# Patient Record
Sex: MALE | Race: ASIAN | NOT HISPANIC OR LATINO | Employment: FULL TIME
[De-identification: names, ages, dates, MRNs, and addresses within clinical notes are randomized per-mention and may not be internally consistent; named-entity substitution may affect disease eponyms.]

---

## 2017-10-28 ENCOUNTER — RECORDS - HEALTHEAST (OUTPATIENT)
Dept: ADMINISTRATIVE | Facility: OTHER | Age: 42
End: 2017-10-28

## 2020-09-04 ENCOUNTER — RECORDS - HEALTHEAST (OUTPATIENT)
Dept: ADMINISTRATIVE | Facility: OTHER | Age: 45
End: 2020-09-04

## 2020-09-09 ENCOUNTER — COMMUNICATION - HEALTHEAST (OUTPATIENT)
Dept: SCHEDULING | Facility: CLINIC | Age: 45
End: 2020-09-09

## 2020-09-23 ENCOUNTER — AMBULATORY - HEALTHEAST (OUTPATIENT)
Dept: OTHER | Facility: CLINIC | Age: 45
End: 2020-09-23

## 2021-05-29 ENCOUNTER — RECORDS - HEALTHEAST (OUTPATIENT)
Dept: ADMINISTRATIVE | Facility: CLINIC | Age: 46
End: 2021-05-29

## 2021-05-31 VITALS — HEIGHT: 60 IN | BODY MASS INDEX: 24.02 KG/M2

## 2021-05-31 VITALS — WEIGHT: 123 LBS

## 2021-06-11 NOTE — TELEPHONE ENCOUNTER
Daughter Julia is calling about covid.  Father is positive for covid for 5 days now.  Daughter states that he is getting worse.  Father is coughing all night and coughing is hurting stomach.  At night it is harder to breath.  Coughing is so severe that Robbin states that his stomach hurts when coughing.  Daughter is not sure of his 02 saturation.  Daughter feels that father is getting worse.    COVID 19 Nurse Triage Plan/Patient Instructions    Please be aware that novel coronavirus (COVID-19) may be circulating in the community. If you develop symptoms such as fever, cough, or SOB or if you have concerns about the presence of another infection including coronavirus (COVID-19), please contact your health care provider or visit [email or web address].     Disposition/Instructions    ED Visit recommended. Follow protocol based instructions.      Bring Your Own Device:  Please also bring your smart device(s) (smart phones, tablets, laptops) and their charging cables for your personal use and to communicate with your care team during your visit.      Thank you for taking steps to prevent the spread of this virus.  o Limit your contact with others.  o Wear a simple mask to cover your cough.  o Wash your hands well and often.    Resources    [facility]: About COVID-19: [email or web address]    CDC: What to Do If You're Sick: www.cdc.gov/coronavirus/2019-ncov/about/steps-when-sick.html    CDC: Ending Home Isolation: www.cdc.gov/coronavirus/2019-ncov/hcp/disposition-in-home-patients.html     CDC: Caring for Someone: www.cdc.gov/coronavirus/2019-ncov/if-you-are-sick/care-for-someone.html     [facility]: Interim Guidance for Hospital Discharge to Home: www.health.[facility].[place]/diseases/coronavirus/hcp/hospdischarge.pdf    [facility] clinical trials (COVID-19 research studies): clinicalaffairs.[facility]/umn-clinical-trials     Below are the COVID-19 hotlines at the [place] Department of Health ([facility]).  "Interpreters are available.   o For health questions: Call 423-408-1185 or 1-814.216.6415 (7 a.m. to 7 p.m.)  o For questions about schools and childcare: Call 923-472-0460 or 1-178.335.2604 (7 a.m. to 7 p.m.)       Additional Information    Negative: [1] Breathing stopped AND [2] hasn't returned    Negative: Choking on something    Negative: Severe difficulty breathing (e.g., struggling for each breath, speaks in single words)    Negative: Bluish (or gray) lips or face now    Negative: Difficult to awaken or acting confused (e.g., disoriented, slurred speech)    Negative: Passed out (i.e., lost consciousness, collapsed and was not responding)    Negative: Wheezing started suddenly after medicine, an allergic food or bee sting    Negative: Stridor    Negative: Slow, shallow and weak breathing    Negative: Sounds like a life-threatening emergency to the triager    [1] MILD difficulty breathing (e.g., minimal/no SOB at rest, SOB with walking, pulse <100) AND [2] NEW-onset or WORSE than normal    Negative: Fever > 103 F (39.4 C)    Negative: [1] Fever > 101 F (38.3 C) AND [2] age > 60    Negative: [1] Fever > 100.0 F (37.8 C) AND [2] bedridden (e.g., nursing home patient, CVA, chronic illness, recovering from surgery)    Negative: [1] Fever > 100.0 F (37.8 C) AND [2] diabetes mellitus or weak immune system (e.g., HIV positive, cancer chemo, splenectomy, organ transplant, chronic steroids)    Negative: [1] Periods where breathing stops and then resumes normally AND [2] bedridden (e.g., nursing home patient, CVA)    Negative: Pregnant or postpartum (< 1 month since delivery)    Negative: Patient sounds very sick or weak to the triager    Negative: Wheezing can be heard across the room    Negative: Drooling or spitting out saliva (because can't swallow)    Negative: History of prior \"blood clot\" in leg or lungs (i.e., deep vein thrombosis, pulmonary embolism)    Negative: History of inherited increased risk of blood clots " "(e.g., Factor 5 Leiden, Anti-thrombin 3, Protein C or Protein S deficiency, Prothrombin mutation)    Negative: Recent illness requiring prolonged bedrest (i.e., immobilization)    Negative: Hip or leg fracture in past 2 months (e.g., had cast on leg or ankle)    Negative: Major surgery in the past month    Negative: Recent long-distance travel with prolonged time in car, bus, plane, or train (i.e., within past 2 weeks; 6 or  more hours duration)    Negative: Extra heart beats OR irregular heart beating   (i.e., \"palpitations\")    Negative: [1] MODERATE difficulty breathing (e.g., speaks in phrases, SOB even at rest, pulse 100-120) AND [2] NEW-onset or WORSE than normal    Protocols used: BREATHING DIFFICULTY-A-AH      "

## 2021-06-11 NOTE — PROGRESS NOTES
Received intake for home oxygen at 10:23AM from my coworker Apolinar. Reviewed patient's chart; need signed MD order for O2.  10:42AM- Called care coordinator Cathy and let her know that F2F notes and order are needed.  10:48AM- Called patient with [place]  to offer choice and patient is okay with [facility] setting them up.I let him know we are waiting for signed order from doctor and once received, we will be to bedside within 2 hours.  11:36AM- Care coordinator Cathy called me, she told me that the patient is now refusing O2. She will call back if patient changes mind.

## 2021-06-16 PROBLEM — J12.82 PNEUMONIA DUE TO COVID-19 VIRUS: Status: ACTIVE | Noted: 2020-09-09

## 2021-06-16 PROBLEM — U07.1 PNEUMONIA DUE TO COVID-19 VIRUS: Status: ACTIVE | Noted: 2020-09-09

## 2021-06-16 PROBLEM — U07.1 COVID-19: Status: ACTIVE | Noted: 2020-09-12

## 2022-07-10 ENCOUNTER — HOSPITAL ENCOUNTER (INPATIENT)
Facility: HOSPITAL | Age: 47
LOS: 3 days | Discharge: HOME | DRG: 867 | End: 2022-07-13
Attending: EMERGENCY MEDICINE | Admitting: INTERNAL MEDICINE
Payer: COMMERCIAL

## 2022-07-10 ENCOUNTER — APPOINTMENT (OUTPATIENT)
Dept: ULTRASOUND IMAGING | Facility: HOSPITAL | Age: 47
DRG: 867 | End: 2022-07-10
Attending: INTERNAL MEDICINE
Payer: COMMERCIAL

## 2022-07-10 ENCOUNTER — APPOINTMENT (OUTPATIENT)
Dept: CARDIOLOGY | Facility: HOSPITAL | Age: 47
DRG: 867 | End: 2022-07-10
Attending: EMERGENCY MEDICINE
Payer: COMMERCIAL

## 2022-07-10 ENCOUNTER — APPOINTMENT (OUTPATIENT)
Dept: RADIOLOGY | Facility: HOSPITAL | Age: 47
DRG: 867 | End: 2022-07-10
Attending: EMERGENCY MEDICINE
Payer: COMMERCIAL

## 2022-07-10 ENCOUNTER — APPOINTMENT (OUTPATIENT)
Dept: CT IMAGING | Facility: HOSPITAL | Age: 47
DRG: 867 | End: 2022-07-10
Attending: EMERGENCY MEDICINE
Payer: COMMERCIAL

## 2022-07-10 DIAGNOSIS — D69.6 THROMBOCYTOPENIA (H): ICD-10-CM

## 2022-07-10 DIAGNOSIS — I21.3 ST ELEVATION MI (STEMI) (H): ICD-10-CM

## 2022-07-10 DIAGNOSIS — S30.861D TICK BITE OF ABDOMEN, SUBSEQUENT ENCOUNTER: ICD-10-CM
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
## 2022-07-10 DIAGNOSIS — I21.4 ACUTE NON-ST SEGMENT ELEVATION MYOCARDIAL INFARCTION (H): ICD-10-CM

## 2022-07-10 DIAGNOSIS — R50.9 ACUTE FEBRILE ILLNESS: ICD-10-CM

## 2022-07-10 DIAGNOSIS — R79.89 ELEVATED LIVER FUNCTION TESTS: ICD-10-CM

## 2022-07-10 LAB
ALBUMIN SERPL-MCNC: 2.8 G/DL (ref 3.5–5)
ALBUMIN SERPL-MCNC: 2.8 G/DL (ref 3.5–5)
ALBUMIN UR-MCNC: 30 MG/DL
ALP SERPL-CCNC: 151 U/L (ref 45–120)
ALP SERPL-CCNC: 152 U/L (ref 45–120)
ALT SERPL W P-5'-P-CCNC: 150 U/L (ref 0–45)
ALT SERPL W P-5'-P-CCNC: 152 U/L (ref 0–45)
ANION GAP SERPL CALCULATED.3IONS-SCNC: 10 MMOL/L (ref 5–18)
ANION GAP SERPL CALCULATED.3IONS-SCNC: 6 MMOL/L (ref 5–18)
APPEARANCE UR: CLEAR
APTT PPP: 42 SECONDS (ref 22–38)
AST SERPL W P-5'-P-CCNC: 140 U/L (ref 0–40)
AST SERPL W P-5'-P-CCNC: 141 U/L (ref 0–40)
BACTERIA #/AREA URNS HPF: ABNORMAL /HPF
BASOPHILS # BLD MANUAL: 0 10E3/UL (ref 0–0.2)
BASOPHILS NFR BLD MANUAL: 0 %
BILIRUB DIRECT SERPL-MCNC: 1.3 MG/DL
BILIRUB SERPL-MCNC: 2.2 MG/DL (ref 0–1)
BILIRUB SERPL-MCNC: 2.2 MG/DL (ref 0–1)
BILIRUB UR QL STRIP: ABNORMAL
BUN SERPL-MCNC: 17 MG/DL (ref 8–22)
CALCIUM SERPL-MCNC: 8.9 MG/DL (ref 8.5–10.5)
CHLORIDE BLD-SCNC: 91 MMOL/L (ref 98–107)
CHLORIDE BLD-SCNC: 99 MMOL/L (ref 98–107)
CO2 SERPL-SCNC: 27 MMOL/L (ref 22–31)
CO2 SERPL-SCNC: 28 MMOL/L (ref 22–31)
COLOR UR AUTO: YELLOW
CREAT SERPL-MCNC: 0.93 MG/DL (ref 0.7–1.3)
D DIMER PPP FEU-MCNC: 3.88 UG/ML FEU (ref 0–0.5)
EOSINOPHIL # BLD MANUAL: 0 10E3/UL (ref 0–0.7)
EOSINOPHIL NFR BLD MANUAL: 0 %
ERYTHROCYTE [DISTWIDTH] IN BLOOD BY AUTOMATED COUNT: 12.7 % (ref 10–15)
ERYTHROCYTE [DISTWIDTH] IN BLOOD BY AUTOMATED COUNT: 12.8 % (ref 10–15)
ERYTHROCYTE [SEDIMENTATION RATE] IN BLOOD BY WESTERGREN METHOD: 90 MM/HR (ref 0–15)
FIBRINOGEN PPP-MCNC: 815 MG/DL (ref 170–490)
FLUAV RNA SPEC QL NAA+PROBE: NEGATIVE
FLUBV RNA RESP QL NAA+PROBE: NEGATIVE
GFR SERPL CREATININE-BSD FRML MDRD: >90 ML/MIN/1.73M2
GLUCOSE BLD-MCNC: 122 MG/DL (ref 70–125)
GLUCOSE BLDC GLUCOMTR-MCNC: 120 MG/DL (ref 70–99)
GLUCOSE UR STRIP-MCNC: NEGATIVE MG/DL
HCT VFR BLD AUTO: 33.4 % (ref 40–53)
HCT VFR BLD AUTO: 33.4 % (ref 40–53)
HCT VFR BLD AUTO: 37.4 % (ref 40–53)
HCT VFR BLD AUTO: 42.1 % (ref 40–53)
HGB BLD-MCNC: 11.6 G/DL (ref 13.3–17.7)
HGB BLD-MCNC: 11.6 G/DL (ref 13.3–17.7)
HGB BLD-MCNC: 13 G/DL (ref 13.3–17.7)
HGB BLD-MCNC: 14.5 G/DL (ref 13.3–17.7)
HGB UR QL STRIP: NEGATIVE
INR PPP: 1.3 (ref 0.85–1.15)
KETONES UR STRIP-MCNC: NEGATIVE MG/DL
LACTATE SERPL-SCNC: 2 MMOL/L (ref 0.7–2)
LDH SERPL L TO P-CCNC: 509 U/L (ref 125–220)
LEUKOCYTE ESTERASE UR QL STRIP: NEGATIVE
LVEF ECHO: NORMAL
LYMPHOCYTES # BLD MANUAL: 0.5 10E3/UL (ref 0.8–5.3)
LYMPHOCYTES # BLD MANUAL: 0.6 10E3/UL (ref 0.8–5.3)
LYMPHOCYTES # BLD MANUAL: 0.8 10E3/UL (ref 0.8–5.3)
LYMPHOCYTES NFR BLD MANUAL: 10 %
LYMPHOCYTES NFR BLD MANUAL: 11 %
LYMPHOCYTES NFR BLD MANUAL: 12 %
MAGNESIUM SERPL-MCNC: 2 MG/DL (ref 1.8–2.6)
MCH RBC QN AUTO: 30.3 PG (ref 26.5–33)
MCH RBC QN AUTO: 30.6 PG (ref 26.5–33)
MCHC RBC AUTO-ENTMCNC: 34.4 G/DL (ref 31.5–36.5)
MCHC RBC AUTO-ENTMCNC: 34.7 G/DL (ref 31.5–36.5)
MCHC RBC AUTO-ENTMCNC: 34.7 G/DL (ref 31.5–36.5)
MCHC RBC AUTO-ENTMCNC: 34.8 G/DL (ref 31.5–36.5)
MCV RBC AUTO: 87 FL (ref 78–100)
MCV RBC AUTO: 87 FL (ref 78–100)
MCV RBC AUTO: 88 FL (ref 78–100)
MCV RBC AUTO: 88 FL (ref 78–100)
MONOCYTES # BLD MANUAL: 0.3 10E3/UL (ref 0–1.3)
MONOCYTES # BLD MANUAL: 0.3 10E3/UL (ref 0–1.3)
MONOCYTES # BLD MANUAL: 0.5 10E3/UL (ref 0–1.3)
MONOCYTES NFR BLD MANUAL: 10 %
MONOCYTES NFR BLD MANUAL: 5 %
MONOCYTES NFR BLD MANUAL: 5 %
MUCOUS THREADS #/AREA URNS LPF: PRESENT /LPF
NEUTROPHILS # BLD MANUAL: 4.2 10E3/UL (ref 1.6–8.3)
NEUTROPHILS # BLD MANUAL: 4.6 10E3/UL (ref 1.6–8.3)
NEUTROPHILS # BLD MANUAL: 5.2 10E3/UL (ref 1.6–8.3)
NEUTROPHILS NFR BLD MANUAL: 79 %
NEUTROPHILS NFR BLD MANUAL: 82 %
NEUTROPHILS NFR BLD MANUAL: 83 %
NITRATE UR QL: NEGATIVE
OTHER CELLS # BLD MANUAL: 0.1 10E3/UL
OTHER CELLS NFR BLD MANUAL: 1 %
PH UR STRIP: 6.5 [PH] (ref 5–7)
PLASMA CELLS # BLD MANUAL: 0.1 10E3/UL
PLASMA CELLS # BLD MANUAL: 0.1 10E3/UL
PLASMA CELLS NFR BLD MANUAL: 1 %
PLASMA CELLS NFR BLD MANUAL: 1 %
PLAT MORPH BLD: ABNORMAL
PLATELET # BLD AUTO: 75 10E3/UL (ref 150–450)
PLATELET # BLD AUTO: 75 10E3/UL (ref 150–450)
PLATELET # BLD AUTO: 79 10E3/UL (ref 150–450)
PLATELET # BLD AUTO: 82 10E3/UL (ref 150–450)
POTASSIUM BLD-SCNC: 3.3 MMOL/L (ref 3.5–5)
POTASSIUM BLD-SCNC: 3.4 MMOL/L (ref 3.5–5)
POTASSIUM BLD-SCNC: 3.7 MMOL/L (ref 3.5–5)
PROCALCITONIN SERPL-MCNC: 2.07 NG/ML (ref 0–0.49)
PROT SERPL-MCNC: 7.9 G/DL (ref 6–8)
PROT SERPL-MCNC: 8 G/DL (ref 6–8)
RBC # BLD AUTO: 3.83 10E6/UL (ref 4.4–5.9)
RBC # BLD AUTO: 3.83 10E6/UL (ref 4.4–5.9)
RBC # BLD AUTO: 4.25 10E6/UL (ref 4.4–5.9)
RBC # BLD AUTO: 4.79 10E6/UL (ref 4.4–5.9)
RBC MORPH BLD: ABNORMAL
RBC URINE: <1 /HPF
RETICS # AUTO: 0.02 10E6/UL (ref 0.01–0.11)
RETICS/RBC NFR AUTO: 0.6 % (ref 0.8–2.7)
SARS-COV-2 RNA RESP QL NAA+PROBE: NEGATIVE
SODIUM SERPL-SCNC: 129 MMOL/L (ref 136–145)
SODIUM SERPL-SCNC: 132 MMOL/L (ref 136–145)
SP GR UR STRIP: 1.01 (ref 1–1.03)
TROPONIN I SERPL-MCNC: 0.06 NG/ML (ref 0–0.29)
TROPONIN I SERPL-MCNC: 0.12 NG/ML (ref 0–0.29)
UROBILINOGEN UR STRIP-MCNC: 12 MG/DL
WBC # BLD AUTO: 5.3 10E3/UL (ref 4–11)
WBC # BLD AUTO: 5.3 10E3/UL (ref 4–11)
WBC # BLD AUTO: 5.5 10E3/UL (ref 4–11)
WBC # BLD AUTO: 6.4 10E3/UL (ref 4–11)
WBC URINE: 5 /HPF

## 2022-07-10 PROCEDURE — 85384 FIBRINOGEN ACTIVITY: CPT | Performed by: INTERNAL MEDICINE

## 2022-07-10 PROCEDURE — 76705 ECHO EXAM OF ABDOMEN: CPT

## 2022-07-10 PROCEDURE — 250N000013 HC RX MED GY IP 250 OP 250 PS 637: Performed by: EMERGENCY MEDICINE

## 2022-07-10 PROCEDURE — 83605 ASSAY OF LACTIC ACID: CPT | Performed by: EMERGENCY MEDICINE

## 2022-07-10 PROCEDURE — 84132 ASSAY OF SERUM POTASSIUM: CPT | Performed by: INTERNAL MEDICINE

## 2022-07-10 PROCEDURE — 93005 ELECTROCARDIOGRAM TRACING: CPT | Performed by: EMERGENCY MEDICINE

## 2022-07-10 PROCEDURE — 71045 X-RAY EXAM CHEST 1 VIEW: CPT

## 2022-07-10 PROCEDURE — 258N000003 HC RX IP 258 OP 636: Performed by: INTERNAL MEDICINE

## 2022-07-10 PROCEDURE — 250N000009 HC RX 250: Performed by: INTERNAL MEDICINE

## 2022-07-10 PROCEDURE — 81001 URINALYSIS AUTO W/SCOPE: CPT | Performed by: INTERNAL MEDICINE

## 2022-07-10 PROCEDURE — 87798 DETECT AGENT NOS DNA AMP: CPT | Performed by: EMERGENCY MEDICINE

## 2022-07-10 PROCEDURE — 36415 COLL VENOUS BLD VENIPUNCTURE: CPT | Performed by: EMERGENCY MEDICINE

## 2022-07-10 PROCEDURE — 99207 PR NO CHARGE LOS: CPT | Performed by: INTERNAL MEDICINE

## 2022-07-10 PROCEDURE — 84145 PROCALCITONIN (PCT): CPT | Performed by: INTERNAL MEDICINE

## 2022-07-10 PROCEDURE — 250N000011 HC RX IP 250 OP 636: Performed by: EMERGENCY MEDICINE

## 2022-07-10 PROCEDURE — 250N000013 HC RX MED GY IP 250 OP 250 PS 637: Performed by: INTERNAL MEDICINE

## 2022-07-10 PROCEDURE — 99255 IP/OBS CONSLTJ NEW/EST HI 80: CPT | Mod: 25 | Performed by: INTERNAL MEDICINE

## 2022-07-10 PROCEDURE — 85045 AUTOMATED RETICULOCYTE COUNT: CPT | Performed by: INTERNAL MEDICINE

## 2022-07-10 PROCEDURE — 87040 BLOOD CULTURE FOR BACTERIA: CPT | Performed by: EMERGENCY MEDICINE

## 2022-07-10 PROCEDURE — 85007 BL SMEAR W/DIFF WBC COUNT: CPT | Performed by: EMERGENCY MEDICINE

## 2022-07-10 PROCEDURE — 83735 ASSAY OF MAGNESIUM: CPT | Performed by: INTERNAL MEDICINE

## 2022-07-10 PROCEDURE — 87636 SARSCOV2 & INF A&B AMP PRB: CPT | Performed by: EMERGENCY MEDICINE

## 2022-07-10 PROCEDURE — 85027 COMPLETE CBC AUTOMATED: CPT | Performed by: EMERGENCY MEDICINE

## 2022-07-10 PROCEDURE — 85610 PROTHROMBIN TIME: CPT | Performed by: EMERGENCY MEDICINE

## 2022-07-10 PROCEDURE — 36569 INSJ PICC 5 YR+ W/O IMAGING: CPT

## 2022-07-10 PROCEDURE — 83935 ASSAY OF URINE OSMOLALITY: CPT | Performed by: INTERNAL MEDICINE

## 2022-07-10 PROCEDURE — 85730 THROMBOPLASTIN TIME PARTIAL: CPT | Performed by: EMERGENCY MEDICINE

## 2022-07-10 PROCEDURE — C9803 HOPD COVID-19 SPEC COLLECT: HCPCS

## 2022-07-10 PROCEDURE — 85652 RBC SED RATE AUTOMATED: CPT | Performed by: EMERGENCY MEDICINE

## 2022-07-10 PROCEDURE — 86038 ANTINUCLEAR ANTIBODIES: CPT | Performed by: INTERNAL MEDICINE

## 2022-07-10 PROCEDURE — 86666 EHRLICHIA ANTIBODY: CPT | Performed by: EMERGENCY MEDICINE

## 2022-07-10 PROCEDURE — 258N000003 HC RX IP 258 OP 636: Performed by: EMERGENCY MEDICINE

## 2022-07-10 PROCEDURE — 99291 CRITICAL CARE FIRST HOUR: CPT | Performed by: INTERNAL MEDICINE

## 2022-07-10 PROCEDURE — 99285 EMERGENCY DEPT VISIT HI MDM: CPT | Mod: 25

## 2022-07-10 PROCEDURE — 250N000011 HC RX IP 250 OP 636: Performed by: INTERNAL MEDICINE

## 2022-07-10 PROCEDURE — 84484 ASSAY OF TROPONIN QUANT: CPT | Performed by: INTERNAL MEDICINE

## 2022-07-10 PROCEDURE — 84484 ASSAY OF TROPONIN QUANT: CPT | Performed by: EMERGENCY MEDICINE

## 2022-07-10 PROCEDURE — 86618 LYME DISEASE ANTIBODY: CPT | Performed by: EMERGENCY MEDICINE

## 2022-07-10 PROCEDURE — 85027 COMPLETE CBC AUTOMATED: CPT | Performed by: INTERNAL MEDICINE

## 2022-07-10 PROCEDURE — 96361 HYDRATE IV INFUSION ADD-ON: CPT

## 2022-07-10 PROCEDURE — 200N000001 HC R&B ICU

## 2022-07-10 PROCEDURE — 36592 COLLECT BLOOD FROM PICC: CPT | Performed by: EMERGENCY MEDICINE

## 2022-07-10 PROCEDURE — 82248 BILIRUBIN DIRECT: CPT | Performed by: EMERGENCY MEDICINE

## 2022-07-10 PROCEDURE — 272N000452 HC KIT SHRLOCK 5FR POWER PICC TRIPLE LUMEN

## 2022-07-10 PROCEDURE — 83615 LACTATE (LD) (LDH) ENZYME: CPT | Performed by: EMERGENCY MEDICINE

## 2022-07-10 PROCEDURE — 70450 CT HEAD/BRAIN W/O DYE: CPT

## 2022-07-10 PROCEDURE — 99292 CRITICAL CARE ADDL 30 MIN: CPT | Performed by: INTERNAL MEDICINE

## 2022-07-10 PROCEDURE — 999N000208 ECHOCARDIOGRAM COMPLETE

## 2022-07-10 PROCEDURE — 96374 THER/PROPH/DIAG INJ IV PUSH: CPT | Mod: 59

## 2022-07-10 PROCEDURE — C9113 INJ PANTOPRAZOLE SODIUM, VIA: HCPCS | Performed by: INTERNAL MEDICINE

## 2022-07-10 PROCEDURE — 85060 BLOOD SMEAR INTERPRETATION: CPT | Performed by: PATHOLOGY

## 2022-07-10 PROCEDURE — 93306 TTE W/DOPPLER COMPLETE: CPT | Mod: 26 | Performed by: INTERNAL MEDICINE

## 2022-07-10 PROCEDURE — 85007 BL SMEAR W/DIFF WBC COUNT: CPT | Performed by: INTERNAL MEDICINE

## 2022-07-10 PROCEDURE — 36415 COLL VENOUS BLD VENIPUNCTURE: CPT | Performed by: INTERNAL MEDICINE

## 2022-07-10 PROCEDURE — 85379 FIBRIN DEGRADATION QUANT: CPT | Performed by: EMERGENCY MEDICINE

## 2022-07-10 PROCEDURE — 255N000002 HC RX 255 OP 636: Performed by: EMERGENCY MEDICINE

## 2022-07-10 RX ORDER — NITROGLYCERIN 0.4 MG/1
0.4 TABLET SUBLINGUAL EVERY 5 MIN PRN
Status: DISCONTINUED | OUTPATIENT
Start: 2022-07-10 | End: 2022-07-13 | Stop reason: HOSPADM

## 2022-07-10 RX ORDER — ACETAMINOPHEN 325 MG/1
650 TABLET ORAL ONCE
Status: COMPLETED | OUTPATIENT
Start: 2022-07-10 | End: 2022-07-10

## 2022-07-10 RX ORDER — POTASSIUM CHLORIDE 1.5 G/1.58G
20 POWDER, FOR SOLUTION ORAL ONCE
Status: COMPLETED | OUTPATIENT
Start: 2022-07-10 | End: 2022-07-10

## 2022-07-10 RX ORDER — SODIUM CHLORIDE 9 MG/ML
INJECTION, SOLUTION INTRAVENOUS CONTINUOUS
Status: DISCONTINUED | OUTPATIENT
Start: 2022-07-11 | End: 2022-07-13

## 2022-07-10 RX ORDER — DEXTROSE MONOHYDRATE 25 G/50ML
25-50 INJECTION, SOLUTION INTRAVENOUS
Status: DISCONTINUED | OUTPATIENT
Start: 2022-07-10 | End: 2022-07-13 | Stop reason: HOSPADM

## 2022-07-10 RX ORDER — NICOTINE POLACRILEX 4 MG
15-30 LOZENGE BUCCAL
Status: DISCONTINUED | OUTPATIENT
Start: 2022-07-10 | End: 2022-07-13 | Stop reason: HOSPADM

## 2022-07-10 RX ORDER — KETOROLAC TROMETHAMINE 15 MG/ML
15 INJECTION, SOLUTION INTRAMUSCULAR; INTRAVENOUS ONCE
Status: COMPLETED | OUTPATIENT
Start: 2022-07-10 | End: 2022-07-10

## 2022-07-10 RX ORDER — DOXYCYCLINE 100 MG/10ML
100 INJECTION, POWDER, LYOPHILIZED, FOR SOLUTION INTRAVENOUS
Status: DISCONTINUED | OUTPATIENT
Start: 2022-07-10 | End: 2022-07-13

## 2022-07-10 RX ORDER — LIDOCAINE 40 MG/G
CREAM TOPICAL
Status: ACTIVE | OUTPATIENT
Start: 2022-07-10 | End: 2022-07-13

## 2022-07-10 RX ORDER — BENZONATATE 100 MG/1
100 CAPSULE ORAL 3 TIMES DAILY PRN
Status: DISCONTINUED | OUTPATIENT
Start: 2022-07-10 | End: 2022-07-13 | Stop reason: HOSPADM

## 2022-07-10 RX ORDER — ASPIRIN 81 MG/1
324 TABLET, CHEWABLE ORAL ONCE
Status: COMPLETED | OUTPATIENT
Start: 2022-07-10 | End: 2022-07-10

## 2022-07-10 RX ORDER — ACETAMINOPHEN 325 MG/1
650 TABLET ORAL EVERY 4 HOURS PRN
Status: DISCONTINUED | OUTPATIENT
Start: 2022-07-10 | End: 2022-07-13 | Stop reason: HOSPADM

## 2022-07-10 RX ADMIN — SODIUM CHLORIDE 500 ML: 9 INJECTION, SOLUTION INTRAVENOUS at 12:05

## 2022-07-10 RX ADMIN — SODIUM CHLORIDE 500 ML: 9 INJECTION, SOLUTION INTRAVENOUS at 22:50

## 2022-07-10 RX ADMIN — SODIUM CHLORIDE, POTASSIUM CHLORIDE, SODIUM LACTATE AND CALCIUM CHLORIDE 1000 ML: 600; 310; 30; 20 INJECTION, SOLUTION INTRAVENOUS at 14:27

## 2022-07-10 RX ADMIN — ACETAMINOPHEN 650 MG: 325 TABLET ORAL at 18:37

## 2022-07-10 RX ADMIN — DOXYCYCLINE 100 MG: 100 INJECTION, POWDER, LYOPHILIZED, FOR SOLUTION INTRAVENOUS at 15:22

## 2022-07-10 RX ADMIN — BENZONATATE 100 MG: 100 CAPSULE ORAL at 22:44

## 2022-07-10 RX ADMIN — PERFLUTREN 3 ML: 6.52 INJECTION, SUSPENSION INTRAVENOUS at 12:35

## 2022-07-10 RX ADMIN — ASPIRIN 81 MG CHEWABLE TABLET 324 MG: 81 TABLET CHEWABLE at 11:53

## 2022-07-10 RX ADMIN — LIDOCAINE HYDROCHLORIDE 4 ML: 10 INJECTION, SOLUTION EPIDURAL; INFILTRATION; INTRACAUDAL; PERINEURAL at 19:24

## 2022-07-10 RX ADMIN — SODIUM CHLORIDE 500 ML: 9 INJECTION, SOLUTION INTRAVENOUS at 12:50

## 2022-07-10 RX ADMIN — SODIUM CHLORIDE 75 ML/HR: 9 INJECTION, SOLUTION INTRAVENOUS at 23:51

## 2022-07-10 RX ADMIN — PANTOPRAZOLE SODIUM 40 MG: 40 INJECTION, POWDER, FOR SOLUTION INTRAVENOUS at 15:29

## 2022-07-10 RX ADMIN — KETOROLAC TROMETHAMINE 15 MG: 15 INJECTION, SOLUTION INTRAMUSCULAR; INTRAVENOUS at 11:54

## 2022-07-10 RX ADMIN — POTASSIUM CHLORIDE 20 MEQ: 1.5 POWDER, FOR SOLUTION ORAL at 14:27

## 2022-07-10 RX ADMIN — ACETAMINOPHEN 650 MG: 325 TABLET ORAL at 11:51

## 2022-07-10 ASSESSMENT — ENCOUNTER SYMPTOMS
CHILLS: 1
BACK PAIN: 0
VOMITING: 0
COUGH: 1
DIARRHEA: 0
PALPITATIONS: 0
DYSURIA: 0
ABDOMINAL PAIN: 0
HEMATURIA: 0
HEADACHES: 1
SORE THROAT: 0
FEVER: 1

## 2022-07-10 ASSESSMENT — ACTIVITIES OF DAILY LIVING (ADL)
ADLS_ACUITY_SCORE: 20
WEAR_GLASSES_OR_BLIND: NO
HEARING_DIFFICULTY_OR_DEAF: NO
DIFFICULTY_EATING/SWALLOWING: NO
ADLS_ACUITY_SCORE: 20
ADLS_ACUITY_SCORE: 20
WALKING_OR_CLIMBING_STAIRS_DIFFICULTY: NO
DRESSING/BATHING_DIFFICULTY: NO
CHANGE_IN_FUNCTIONAL_STATUS_SINCE_ONSET_OF_CURRENT_ILLNESS/INJURY: NO
CONCENTRATING,_REMEMBERING_OR_MAKING_DECISIONS_DIFFICULTY: NO
ADLS_ACUITY_SCORE: 20
ADLS_ACUITY_SCORE: 20
DEPENDENT_IADLS:: INDEPENDENT
FALL_HISTORY_WITHIN_LAST_SIX_MONTHS: NO
DOING_ERRANDS_INDEPENDENTLY_DIFFICULTY: YES
DIFFICULTY_COMMUNICATING: NO
TOILETING_ISSUES: NO

## 2022-07-10 NOTE — LETTER
[facility]  [address] [place] [id number]  Phone: [phone]  Fax: [phone]    July 13, 2022        Calixto Murcia  [address] [place] [address]          To whom it may concern:    RE: Calixto Murcia    This is to confirm that Calixto Murcia was admitted in [facility] from 7/10/2022 to 7/13/2022. He is advised to rest until 7/24/22 to allow extra time to recover from his medical condition.     Please contact me for questions or concerns.      Sincerely,        Huan Dyer MD

## 2022-07-10 NOTE — ED PROVIDER NOTES
EMERGENCY DEPARTMENT ENCOUNTER      NAME: Calixto Murcia  AGE: 47 year old male  YOB: 1975  MRN: [id number]  EVALUATION DATE & TIME: 7/10/2022 10:22 AM    PCP: No primary care provider on file.    ED PROVIDER: Kamryn Perdomo M.D.      CHIEF COMPLAINT     Chief Complaint   Patient presents with     Fever     Cough     Headache         FINAL IMPRESSION:     1. ST elevation MI (STEMI) (H)    2. Acute non-ST segment elevation myocardial infarction (H)    3. Acute febrile illness    4. Elevated liver function tests    5. Thrombocytopenia (H)    6. Tick bite of abdomen, subsequent encounter          MEDICAL DECISION MAKING:       Pertinent Labs & Imaging studies reviewed. (See chart for details)    47 year old male presents to the Emergency Department for evaluation of fever, headache, chest pain, cough    9 days ago patient developed fevers and chills and headache.  His wife came home and she told him that she was exposed to COVID.  This has continued.    Yesterday patient started having chest pain.  He noticed that it is mostly when he coughs or when he takes a deep breath but denies having shortness of breath.    No nausea vomiting diarrhea dysuria hematuria leg swelling.    He went camping at the end of June and he had a tick bite he said was a deer tick and he removed it.    He has had COVID previously back in 2020 and 2021 has been vaccinated and boosted x1.    He otherwise has no medical problems    He reports that his headache is a 10 out of 10.  He is worse when he touches the top of his head.    Denies any IV drug use.    Examination is nontoxic looks like he does not feel well.  His neck is supple.  There is no conjunctival injection.  There is tachycardic no murmurs lung clear.  He does have a small erythema located in the left lower abdomen.  There is no erythema migrans.  There is no rashes on the palms on the soles with no petechia no purpura.  He is oriented x3 no meningeal signs.    Patient  placed on pulse according to pressure monitor given Tylenol and Toradol as an antipyretic.  Started on fluids.  Initial concern was for COVID given his exposure.  COVID and influenza were negative.  Given that he reports chest pain an EKG was done that shows diffuse anterior ST segment elevation with ST segment elevation.  No gross ST segment depression.    Immediately paged cardiology.  Dr. Alvares recommended activating the Cath Lab.    Was noted the patient was thrombocytopenic platelets of 79,000.  Discussed with him anticoagulation and he says the headache was really bothering him therefore did a CT head prior to anticoagulation and this was negative.    Prior to these have spoken with Dr. Hager interventional cardiologist who recommended no heparin or Brilinta for patient.    Dr. Alvares at bedside.  Interventional cardiologist took patient to the Cath Lab.  I spoke with Dr. Jiménez infectious disease DrCasey Notify him of the low sodium thrombocytopenia and elevated liver function test is a history of tick bite he recommends patient be started on doxycycline 100 mg twice daily.      Patient to the Cath Lab in stable condition     ADDENDUM  Spoke with Dr. Medellin intensivist regarding recommendation by infectious disease doctor.           Differential Diagnosis (include but not limited to)  Myocarditis, pericarditis acute coronary syndrome dissection pulmonary embolism  Myocarditis COVID intracranial bleed Lyme disease or leaky anaplasmosis RMSF Brugada among others.       Vital Signs: Febrile  EKG: This is evaluation anterior inferiorly  Imaging: Chest x-ray no acute CT head no acute  Home Meds: Reviewed  ED meds/abx: Tylenol, Toradol, aspirin  Fluids: Normal saline 500 x2     Labs  K 3.3  Cr 0.93  Wbc 4.25  Hgb 13  Platelets 79  Troponin 0.12  Influenza negative.  COVID-negative  Sed rate 90  PTT 42  INR 1.30  D-dimer 3.8      Review of Previous Records        Consults  Cardiologist   Valentin  Interventional cardiology Dr. Hager  Infectious disease,  Dr. Cho       ED COURSE   10:54 I met with patient for initial interview and encounter. PPE worn includes N95 mask.  11:29 AM I paged for cardiology and updated the patient.   11:47 AM I spoke to Dr. Hanson with cardiology regarding plan for care. We will activate STEMI. Patient has no contraindications to anticoagulation.  12:00 PM Patient concerned about his headache and would prefer imaging for that prior to heparinization.   12:06 PM I spoke to Dr. Garcia with interventional cardiology who would like to have limited ECHO and have cardiology call her when it results.  12:23 PM Dr. Hanson is at bedside.  12:26 PM I spoke to Dr. Garcia with interventional cardiology who instructed us to hold the heparin and Brilinta.  Given the mild elevation of the PTT and its importance of anemia fibrinogen was ordered requested by cardiology.  12:53 PM denies pain   1:15 PM I spoke to Dr. Cho with infectious disease regarding plan for care.  1:57 PM I spoke to intensivist Dr. Davis who accepts the patient.  .   12:53 PM and updated.  Denies any pain.  At the conclusion of the encounter I discussed the results of all of the tests and the disposition. The questions were answered. The patient acknowledged understanding and was agreeable with the care plan.       Critical Care     Performed by: Dr Kamryn Perdomo  Authorized by: Dr Kamryn Perdomo  Total critical care time: 40 minutes  Critical care was necessary to treat or prevent imminent or life-threatening deterioration of the following conditions:  Acute febrile illness ST segment elevation anteriorly inferior thrombocytopenia elevated liver function test concern for tickborne illness discussion with cardiologist and infectious disease doctor.  Critical care was time spent personally by me on the following activities: development of treatment plan with patient or surrogate, discussions with  consultants, examination of patient, evaluation of patient's response to treatment, obtaining history from patient or surrogate, ordering and performing treatments and interventions, ordering and review of laboratory studies, ordering and review of radiographic studies, re-evaluation of patient's condition and monitoring for potential decompensation.  Critical care time was exclusive of separately billable procedures and treating other patients.    MEDICATIONS GIVEN IN THE EMERGENCY:     Medications   sodium chloride (PF) 0.9% PF flush 3 mL (has no administration in time range)   sodium chloride (PF) 0.9% PF flush 3 mL (3 mLs Intravenous Given 7/10/22 1206)   0.9% sodium chloride BOLUS (0 mLs Intravenous Hold 7/10/22 1202)   potassium chloride (KLOR-CON) Packet 20 mEq (has no administration in time range)   nitroGLYcerin (NITROSTAT) sublingual tablet 0.4 mg (has no administration in time range)   ketorolac (TORADOL) injection 15 mg (15 mg Intravenous Given 7/10/22 1154)   acetaminophen (TYLENOL) tablet 650 mg (650 mg Oral Given 7/10/22 1151)   aspirin (ASA) chewable tablet 324 mg (324 mg Oral Given 7/10/22 1153)   0.9% sodium chloride BOLUS (0 mLs Intravenous Stopped 7/10/22 1241)   perflutren lipid microsphere (DEFINITY) injection SUSP 3 mL (3 mLs Intravenous Given 7/10/22 1235)   0.9% sodium chloride BOLUS (500 mLs Intravenous New Bag 7/10/22 1250)   0.9% sodium chloride BOLUS (1,000 mLs Intravenous New Bag 7/10/22 1322)       NEW PRESCRIPTIONS STARTED AT TODAY'S ER VISIT     There are no discharge medications for this patient.         =================================================================    HPI     Patient information was obtained from: Patient     Use of : N/A         Calixto Murcia is a 47 year old male who presents by walk-in for evaluation of chest pain, fever.    Patient reports his wife was exposed to COVID-19 last Friday (9 days ago) and patient he began having a subjective fever, chills,  sore throat, and a dry cough shortly afterwards. He presents to the ED today after he developed chest pain which is primarily present with deep breaths and coughing. In the ED, patient also endorses a headache which is present with light palpation of the head. He did take theraflu this morning prior to presenting to the ED. He notes that he was bitten by a tick while camping from 6/24-26. He did notice the tick within 24 hours and removed it. Patient is vaccinated 2x against COVID-19 and boosted x1. He has had COVID-19 twice in the past, once in 2020 and once in 2021. Denies any tearing or ripping back pain. Patient denies any abdominal pain, vomiting, diarrhea, urinary symptoms, leg swelling, or any other complaints.      REVIEW OF SYSTEMS   Review of Systems   Constitutional: Positive for chills and fever (subjective).   HENT: Negative for sore throat.    Respiratory: Positive for cough (dry).    Cardiovascular: Positive for chest pain. Negative for palpitations.   Gastrointestinal: Negative for abdominal pain, diarrhea and vomiting.   Genitourinary: Negative for dysuria and hematuria.   Musculoskeletal: Negative for back pain.   Neurological: Positive for headaches.   All other systems reviewed and are negative.       PAST MEDICAL HISTORY:   History reviewed. No pertinent past medical history.    PAST SURGICAL HISTORY:   History reviewed. No pertinent surgical history.      CURRENT MEDICATIONS:   No current outpatient medications on file.       ALLERGIES:     Allergies   Allergen Reactions     Colton Flavor        FAMILY HISTORY:   No family history on file.    SOCIAL HISTORY:     Social History     Socioeconomic History     Marital status: Single       VITALS:   BP 92/56   Pulse 103   Temp 99  F (37.2  C) (Axillary)   Resp 22   Wt 59.9 kg (132 lb)   SpO2 97%     PHYSICAL EXAM     Physical Exam  Vitals and nursing note reviewed. Exam conducted with a chaperone present.   Constitutional:       General: He is  not in acute distress.     Appearance: Normal appearance. He is obese. He is ill-appearing. He is not toxic-appearing or diaphoretic.   HENT:      Head: Normocephalic and atraumatic.      Right Ear: Ear canal normal.      Left Ear: Tympanic membrane and ear canal normal.      Mouth/Throat:      Mouth: Mucous membranes are dry.   Eyes:      Extraocular Movements: Extraocular movements intact.      Pupils: Pupils are equal, round, and reactive to light.   Neck:      Vascular: No carotid bruit.   Cardiovascular:      Rate and Rhythm: Tachycardia present.      Pulses: Normal pulses.      Heart sounds: Normal heart sounds.   Pulmonary:      Effort: Pulmonary effort is normal.      Breath sounds: Normal breath sounds.   Abdominal:      General: Abdomen is flat.      Palpations: Abdomen is soft.       Musculoskeletal:         General: Normal range of motion.      Cervical back: Normal range of motion and neck supple. No rigidity or tenderness.   Lymphadenopathy:      Cervical: No cervical adenopathy.   Skin:     General: Skin is warm and dry.      Capillary Refill: Capillary refill takes less than 2 seconds.      Findings: Rash present.   Neurological:      General: No focal deficit present.      Mental Status: He is alert and oriented to person, place, and time.   Psychiatric:         Mood and Affect: Mood normal.         Behavior: Behavior normal.             LAB:     All pertinent labs reviewed and interpreted.  Labs Ordered and Resulted from Time of ED Arrival to Time of ED Departure   COMPREHENSIVE METABOLIC PANEL - Abnormal       Result Value    Sodium 129 (*)     Potassium 3.3 (*)     Chloride 91 (*)     Carbon Dioxide (CO2) 28      Anion Gap 10      Urea Nitrogen 17      Creatinine 0.93      Calcium 8.9      Glucose 122      Alkaline Phosphatase 151 (*)      (*)      (*)     Protein Total 8.0      Albumin 2.8 (*)     Bilirubin Total 2.2 (*)     GFR Estimate >90     CBC WITH PLATELETS AND DIFFERENTIAL  - Abnormal    WBC Count 5.5      RBC Count 4.79      Hemoglobin 14.5      Hematocrit 42.1      MCV 88      MCH 30.3      MCHC 34.4      RDW 12.7      Platelet Count 82 (*)    HEPATIC FUNCTION PANEL - Abnormal    Bilirubin Total 2.2 (*)     Bilirubin Direct 1.3 (*)     Protein Total 7.9      Albumin 2.8 (*)     Alkaline Phosphatase 152 (*)      (*)      (*)    D DIMER QUANTITATIVE - Abnormal    D-Dimer Quantitative 3.88 (*)    ERYTHROCYTE SEDIMENTATION RATE AUTO - Abnormal    Erythrocyte Sedimentation Rate 90 (*)    INR - Abnormal    INR 1.30 (*)    PARTIAL THROMBOPLASTIN TIME - Abnormal    aPTT 42 (*)    DIFFERENTIAL - Abnormal    % Neutrophils 83      % Lymphocytes 11      % Monocytes 5      % Eosinophils 0      % Basophils 0      % Plasma Cells 1      Absolute Neutrophils 4.6      Absolute Lymphocytes 0.6 (*)     Absolute Monocytes 0.3      Absolute Eosinophils 0.0      Absolute Basophils 0.0      Absolute Plasma Cells 0.1 (*)     RBC Morphology Confirmed RBC Indices      Platelet Assessment        Value: Automated Count Confirmed. Platelet morphology is normal.   LACTIC ACID WHOLE BLOOD - Normal    Lactic Acid 2.0     INFLUENZA A/B & SARS-COV2 PCR MULTIPLEX - Normal    Influenza A PCR Negative      Influenza B PCR Negative      SARS CoV2 PCR Negative     TROPONIN I - Normal    Troponin I 0.12     LYME DISEASE TOTAL ABS BLD WITH REFLEX TO CONFIRM CLIA   ANAPLASMA PHAGOCYTOPH ANTIBODY IGG IGM   EHRLICHIA ANAPLASMA SP BY PCR   BLOOD CULTURE   BLOOD CULTURE        RADIOLOGY:     Reviewed all pertinent imaging. Please see official radiology report.  Head CT w/o contrast   Final Result   IMPRESSION:     1.  No evidence of acute intracranial hemorrhage or mass effect.      Echocardiogram Complete   Final Result      XR Chest Port 1 View   Final Result   IMPRESSION: Minimal left basilar atelectasis. No pleural effusion. The cardiac silhouette and pulmonary vasculature are normal.      Cardiac  Catheterization    (Results Pending)        EKG:     EKG #1  Sinus tachycardia assessment elevation anteriorly and inferiorly.  No ST segment depression.    Time:[id number]    Ventricular rate 107 bmp  Axis normal  MA interval 170 ms  QRS duration 90 ms  QT//448 ms    Compared to previous EKG on no previous EKGs available for comparison.  I have independently reviewed and interpreted the EKG(s) documented above.      PROCEDURES:     Procedures        I, Osmar Castillo, am serving as a scribe to document services personally performed by Dr. Perdomo based on my observation and the provider's statements to me. I, Kamryn Perdomo MD attest that Osmar Castillo is acting in a scribe capacity, has observed my performance of the services and has documented them in accordance with my direction.    Kamryn Perdomo M.D.  Emergency Medicine  [facility] EMERGENCY DEPARTMENT  [address] [facility] [address]  [phone]  Dept: [phone]     Kamryn Perdomo MD  07/10/22 5939

## 2022-07-10 NOTE — CONSULTS
Care Management Initial Consult    General Information  Assessment completed with: Patient, Calixto  Type of CM/SW Visit: Initial Assessment    Primary Care Provider verified and updated as needed:  (no PMD)   Readmission within the last 30 days: no previous admission in last 30 days      Reason for Consult: discharge planning  Advance Care Planning: Advance Care Planning Reviewed: no concerns identified          Communication Assessment  Patient's communication style: spoken language (English or Bilingual)             Cognitive  Cognitive/Neuro/Behavioral:                        Living Environment:   People in home: spouse     Current living Arrangements: apartment      Able to return to prior arrangements: yes       Family/Social Support:  Care provided by: self  Provides care for: no one  Marital Status:   Wife          Description of Support System: Supportive, Involved    Support Assessment: Adequate family and caregiver support, Adequate social supports, Patient communicates needs well met    Current Resources:   Patient receiving home care services: No     Community Resources: None  Equipment currently used at home: none  Supplies currently used at home: None    Employment/Financial:  Employment Status:          Financial Concerns:             Lifestyle & Psychosocial Needs:  Social Determinants of Health     Tobacco Use: Not on file   Alcohol Use: Not on file   Financial Resource Strain: Not on file   Food Insecurity: Not on file   Transportation Needs: Not on file   Physical Activity: Not on file   Stress: Not on file   Social Connections: Not on file   Intimate Partner Violence: Not on file   Depression: Not on file   Housing Stability: Not on file       Functional Status:  Prior to admission patient needed assistance:   Dependent ADLs:: Independent, Ambulation-no assistive device  Dependent IADLs:: Independent  Assesssment of Functional Status: At functional baseline    Mental Health Status:           Chemical Dependency Status:                Values/Beliefs:  Spiritual, Cultural Beliefs, Orthodox Practices, Values that affect care:                 Additional Information:  Calixto lives in an apartment with his wife. He is independent with ADLs at baseline.     Likely no discharge needs at this time.    Wife to transport at discharge.    Malina Taylor RN

## 2022-07-10 NOTE — ED NOTES
EKG tracing shows ST elevation, ED Provider aware and in discussion with Cardiology. Concerns for STEMI.

## 2022-07-10 NOTE — PHARMACY-ADMISSION MEDICATION HISTORY
Pharmacy Note - Admission Medication History    Pertinent Provider Information: Pt states he took some Theraflu and Ibuprofen this am.     ______________________________________________________________________    Prior To Admission (PTA) med list completed and updated in EMR.       No outpatient medications have been marked as taking for the 7/10/22 encounter (Hospital Encounter).       Information source(s): Patient  Method of interview communication: in-person    Summary of Changes to PTA Med List  New: n/a  Discontinued: n/a  Changed: n/a    Patient was asked about OTC/herbal products specifically.  PTA med list reflects this.    In the past week, patient estimated taking medication this percent of the time:  greater than 90%.    Allergies were reviewed, assessed, and updated with the patient.      Patient does not use any multi-dose medications prior to admission.    The information provided in this note is only as accurate as the sources available at the time of the update(s).    Thank you for the opportunity to participate in the care of this patient.    Elvia Abdalla RPH  7/10/2022 12:40 PM

## 2022-07-10 NOTE — ED NOTES
Pt. A little shaky during IV start, appears to have intermittent increase in breathing with occasional dry cough.

## 2022-07-10 NOTE — CONSULTS
[facility]  Cardiac Electrophysiology  [address]  [place] [address]   Office: [phone]  Fax: [phone]     Cardiac Electrophysiology Consultation    Patient: Calixto Murcia   : 1975     Referring Provider: Jemima Garcia    CHIEF COMPLAINT/REASON FOR CONSULTATION  Chest pain, ST elevation    Assessment/Recommendations   Calixto Murcia is a 47 year old male without past medical history presenting with fever, chest pain - urgent cardiology consult is sought for evaluation of anterior ST elevation.    Chest pain, ST elevation - noted in the setting of febrile illness.  Differential includes febrile type I BrS (no prior ECGs for review), myopericarditis, pLAD occlusion  - will proceed with coronary angiography to rule out occlusive LAD disease  - we will defer initiation of second antiplatelet agent, heparin pending angiography  - antipyretics and IV fluids  - further evaluation of infectious etiologies  - follow up formal TTE  - trend cardiac enzymes (CK, CB-MB, TnI0  - can consider cardiac MRI pending course           History of Present Illness   Calixto Murcia is a 47 year old male without past medical history presenting with fever, chest pain - urgent cardiology consult is sought for evaluation of anterior ST elevation.    Mr. Murcia notes onset of fevers and headache since around 2022.  On 2022 evening, he developed pleuritic arabella chest pain and presented to the ER 7/10/2022 with note of sinus tachycardia, anterior and anterior ST elevation without reciprocal changes, TnI 0.12, TTE (formal read pending) without note of wall motion defect.  His evaluation is also notable for plts 82k, sNa 129, K 3.3, ALT and AST approximately 3x ULN.      He works as a .  He denies any history of syncope.  He denies family history of SCD - his father had a CVA.       Physical Examination  Review of Systems   VITALS: BP 96/70   Pulse 110   Temp (!) 101.7  F (38.7  C)  (Oral)   Resp 25   Wt 59.9 kg (132 lb)   SpO2 95%   Wt Readings from Last 3 Encounters:   07/10/22 59.9 kg (132 lb)       Intake/Output Summary (Last 24 hours) at 7/10/2022 1235  Last data filed at 7/10/2022 1206  Gross per 24 hour   Intake 3 ml   Output --   Net 3 ml     CONSTITUTIONAL: no distress  EYES:  Conjunctivae pink, some scleral injection   E/N/T:  Oral mucosa pink, moist mucous membranes.    RESPIRATORY:  Respiratory effort is normal  CARDIOVASCULAR:  Tachycardic, regular, normal S1 and S2  GASTROINTESTINAL:  Abdomen without masses or tenderness  EXTREMITIES:  No clubbing or cyanosis.    MUSCULOSKELETAL:  Overall grossly normal muscle strength  SKIN:  Overall, skin warm and dry, no lesions.  NEURO/PSYCH:  Oriented x 3 with normal affect.   Constitutional:  No weight loss or loss of appetite    Eyes:  No difficulty with vision, no double vision, no dry eyes  ENT:  No sore throat, difficulty swallowing; changes in hearing or tinnitus  Cardiovascular: As detailed above  Respiratory:  No cough  Musculoskeletal  No joint pain, muscle aches  Neurologic:  No syncope, lightheadedness, fainting spells   Hematologic: No easy bruising, excessive bleeding tendency   Gastrointestinal:  No jaundice, abdominal pain or abdominal bloating  Genitourinary: No changes in urinary habits, no trouble urinating    Psychiatric: No anxiety or depression      Medical History  Surgical History   History reviewed. No pertinent past medical history. History reviewed. No pertinent surgical history.      Family History Social History   No family history on file.            Medications  Allergies     Current Facility-Administered Medications:      0.9% sodium chloride BOLUS, 1,000 mL, Intravenous, Once, Kamryn Perdomo MD, Held at 07/10/22 1202     nitroGLYcerin (NITROSTAT) sublingual tablet 0.4 mg, 0.4 mg, Sublingual, Q5 Min PRN, Kamryn Perdomo MD     potassium chloride (KLOR-CON) Packet 20 mEq, 20 mEq, Oral, Once, Conor  Kamryn APONTE MD     sodium chloride (PF) 0.9% PF flush 3 mL, 3 mL, Intravenous, Q1H PRN, Kamryn Perdomo MD     sodium chloride (PF) 0.9% PF flush 3 mL, 3 mL, Intravenous, Q8H, Kamryn Perdomo MD, 3 mL at 07/10/22 1206  No current outpatient medications on file.     Allergies   Allergen Reactions     Elsinore Flavor           Lab Results    Chemistry CBC Cardiac Enzymes/BNP/TSH/INR   Recent Labs   Lab Test 07/10/22  1054   *   POTASSIUM 3.3*   CHLORIDE 91*   CO2 28      BUN 17   CR 0.93   GFRESTIMATED >90   LISA 8.9     Recent Labs   Lab Test 07/10/22  1054   CR 0.93          Recent Labs   Lab Test 07/10/22  1149 07/10/22  1054   WBC  --  5.5   HGB 13.0* 14.5   HCT 37.4* 42.1   MCV 88 88   PLT 79* 82*     Recent Labs   Lab Test 07/10/22  1149 07/10/22  1054   HGB 13.0* 14.5    Recent Labs   Lab Test 07/10/22  1054   TROPONINI 0.12     No results for input(s): BNP, NTBNPI, NTBNP in the last 80791 hours.  No results for input(s): TSH in the last 52074 hours.  Recent Labs   Lab Test 07/10/22  1149   INR 1.30*         Data Review    ECGs (all tracings independently reviewed)  7/10/2022 - [id number], anterior and inferior ALEXANDER, no reciprocal changes, possible rsr' V1-3      7/10/2022 TTE  Formal read pending.  On my review, normal LVEF, no regional wall motion defects.  No significant pericardial effusion.           Tomas Hanson MD  7/10/2022  12:35 PM

## 2022-07-10 NOTE — H&P
CRITICAL CARE ADMISSION:    Assessment/Plan:  47 year old male former smoker with a history of prior COVID-19 infection, tick bite at the end of June 2022, admitted with 9 days of fever, headache, malaise, and inspiratory epigastric/lower chest discomfort, found to have hypotension, tachycardia, thrombocytopenia, coagulopathy, hyponatremia, transaminase elevations, hyperbilirubinemia, and ECG changes.    RESP:  No acute issues. Nonspecific elevated D-dimer. Tachycardic. No RV dilation on TTE, IVC collapsing >50%, not hypoxic; other clinical findings not consistent with PE. Does have bibasilar crackles. Could consider pleuritis due to SLE.    Monitor    Currently on room air    Consider chest CTA if lack of clinical improvement with other measures or unclear diagnosis    CV:  Abnormal ECG and TTE: Marked ST changes, negative troponin, no angina. May be myocarditis from anaplasmosis or Lyme disease. Does have possible WMA on TTE. Collapsing IVC on TTE.    Additional 1000 mL LR    PICC; not needing pressors now    Appreciate cardiology consultation    Repeat troponin    No anginal symptoms    NEURO:  Headache. Risk of CNS involvement with anaplasma or Lyme but no meningeal signs, cognition normal. Head CT unremarkable. Headache nonspecific but common with anaplasmosis.    monitor    GI:  Transaminase elevation, hyperbilirubinemia. 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 DDx includes cholecystits, shock liver.    Supportive care    RUQ US    Follow LFTs    RENAL:  Hyponatremia, hypokalemia. Likely SIADH, low intake, hypovolemia.    IV fluids    Electrolyte replacement    Check urine osm    ID:  Fever. 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 Small erythematous papule LLQ with scab, must have attached for some time. No erythema migrans. 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 Patient is vaccinated against COVID-19 and reports he had COVID-19 twice. His wife current has covid but the patient has  had multiple recent negative covid tests including on 7/7 at [facility] urgent care and again on 7/10.    doxycycline    Smear for anaplasma, babesia    Lyme serologies    Blood cultures    UA-reflex UC    HEMATOLOGIC:  Thrombocytopenia, coagulopathy. 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 DDx includes DIC though fibrinogen normal. CTD such as SLE less likely, though does have some features.    Peripheral smear    SALAS    Supportive care    ENDOCRINE:  Euglycemic.    monitor    ICU PROPHYLAXIS:    SCDs    PPI    Lines/Drains/Tubes:  Will place PICC    CODE STATUS, DISPOSITION, FAMILY COMMUNICATION: Full code. Critically ill with hypotension and DIC, high risk of decompensation. If remains stable can transfer out of ICU. Communicated directly with the patient.    Restraints  Not indicated    Jose Angel Medellin MD  Pulmonary and Critical Care Medicine  [facility]  Office [phone]  Pager [phone]  (he/him/his)    CC: fever, hypotension    HPI: 47 year old male former smoker with a history of prior COVID-19 infection, tick bite at the end of June 2022, admitted with 9 days of fever, headache, malaise, and inspiratory epigastric/lower chest discomfort, found to have hypotension, tachycardia, thrombocytopenia, coagulopathy, hyponatremia, transaminase elevations, hyperbilirubinemia, and ECG changes. Reports 9 days of fever, malaise, headache. End of June had tick bite LLQ of abdomen, pulled it off but has small red lump with scab. No erythema migrans. Here he is tachycardic, borderline hypotensive, thrombocytopenic, hyponatremic, elevated INR, transaminases, bili. No meningeal signs. No chest pain or pressure but epigastric discomfort with deep breath. ECG with ST elevations anteriorly; TTE with some posterior motion changes. Normal RV function, IVC collapses >50%.    Past Medical History:  unremarkable    Past Surgical History:  History reviewed. No pertinent surgical history.    Social History:  Social History     Socioeconomic  History     Marital status: Single     Spouse name: Not on file     Number of children: Not on file     Years of education: Not on file     Highest education level: Not on file   Occupational History     Not on file   Tobacco Use     Smoking status: Not on file     Smokeless tobacco: Not on file   Substance and Sexual Activity     Alcohol use: Not on file     Drug use: Not on file     Sexual activity: Not on file   Other Topics Concern     Not on file   Social History Narrative     Not on file     Social Determinants of Health     Financial Resource Strain: Not on file   Food Insecurity: Not on file   Transportation Needs: Not on file   Physical Activity: Not on file   Stress: Not on file   Social Connections: Not on file   Intimate Partner Violence: Not on file   Housing Stability: Not on file       Family History:  No family history on file.    Allergies:  Allergies   Allergen Reactions     Colton Flavor        MAR Reviewed      Physical Exam:  Vent settings for last 24 hours:  Resp: 21      BP 93/59   Pulse 98   Temp 97.8  F (36.6  C)   Resp 21   Wt 59.9 kg (132 lb)   SpO2 96%     Intake/Output last 3 shifts:  No intake/output data recorded.  Intake/Output this shift:  I/O this shift:  In: 503 [I.V.:3; IV Piggyback:500]  Out: -     Physical Exam  Gen: alert, oriented, no distress  HEENT: NT, no AG  CV: tachy, regular, no m/g/r  Resp: bibasilar crackles  Abd: soft, epigastric TTP, BS+  Skin: small erythematous papule with central scab about 8 mm LLQ of abdomen, no erythema migrans  Ext: no edema  Neuro: PERRL, nonfocal exam    IMAGING:  CXR (7/10/22):  - images directly reviewed, formal interpretation follows:  IMPRESSION: Minimal left basilar atelectasis. No pleural effusion. The cardiac silhouette and pulmonary vasculature are normal.    Total Critical Care Time : 90 minutes    Clinically Significant Risk Factors Present on Admission         # Hyponatremia: Na = 129 mmol/L (Ref range: 136 - 145 mmol/L) on  admission, will monitor as appropriate     # Hypoalbuminemia: Albumin = 2.8 g/dL (Ref range: 3.5 - 5.0 g/dL); 2.8 g/dL (Ref range: 3.5 - 5.0 g/dL) on admission, will monitor as appropriate   # Coagulation Defect: INR = 1.30 (Ref range: 0.85 - 1.15) and/or PTT = 42 Seconds (Ref range: 22 - 38 Seconds) on admission, will monitor for bleeding  # Thrombocytopenia: Plts = 79 10e3/uL (Ref range: 150 - 450 10e3/uL) on admission, will monitor for bleeding

## 2022-07-10 NOTE — ED TRIAGE NOTES
C/o cough, fever, chest pain and headache x 5 days. Rates headache pain 10/10. Chest pain and headache are worse with coughing.     Patient took theraflu this morning at 0800

## 2022-07-10 NOTE — PROGRESS NOTES
I spoke with the patient briefly in the cath lab. Dr. Hanson and I reviewed the echo images with Dr. Duarte and discussed the patient and his clinical findings and history. Mr. Murcia only has very brief, sharp chest pain that is bilateral with deep breaths. It resolves immediately. There is no central chest pain or back pain and no pressure or persistent discomfort. He denies any dyspnea or palpitations. There has been some orthostasis but no syncope or vertigo. He denies any abdominal pain but has had some early satiety. He denies any edema, pnd/orthopnea, or chest pain when laying flat.     His PTT, D-dimer, and inr are all elevated and his platelets are low. Fibrinogen and LDH are pending. I have a low clinical suspicion of cardiac ischemia.  His EKG is concerning, however. Coronary angiogram was and can still be considered but the concern is that if he has an early DIC picture that he may bleed or thrombose his access site, or even thrombose on the catheters and wires.     The patient understands that we will wait on the angiogram for now but can revisit it as necessary pending further data.    I have put in an order for a repeat CBC and troponin at 4 pm.

## 2022-07-10 NOTE — Clinical Note
Dr. Garcia and Dr. Hanson spoke with patient.  Decision made to not do coronary angiogram procedure at this time.  updating patient's wife via phone.

## 2022-07-11 ENCOUNTER — APPOINTMENT (OUTPATIENT)
Dept: PHYSICAL THERAPY | Facility: HOSPITAL | Age: 47
DRG: 867 | End: 2022-07-11
Attending: INTERNAL MEDICINE
Payer: COMMERCIAL

## 2022-07-11 LAB
ALBUMIN SERPL-MCNC: 2 G/DL (ref 3.5–5)
ALP SERPL-CCNC: 97 U/L (ref 45–120)
ALT SERPL W P-5'-P-CCNC: 83 U/L (ref 0–45)
ANION GAP SERPL CALCULATED.3IONS-SCNC: 7 MMOL/L (ref 5–18)
AST SERPL W P-5'-P-CCNC: 66 U/L (ref 0–40)
ATRIAL RATE - MUSE: 107 BPM
ATRIAL RATE - MUSE: 85 BPM
B BURGDOR IGG+IGM SER QL: 0.66
BILIRUB SERPL-MCNC: 1 MG/DL (ref 0–1)
BUN SERPL-MCNC: 11 MG/DL (ref 8–22)
CALCIUM SERPL-MCNC: 7.8 MG/DL (ref 8.5–10.5)
CHLORIDE BLD-SCNC: 104 MMOL/L (ref 98–107)
CO2 SERPL-SCNC: 23 MMOL/L (ref 22–31)
CREAT SERPL-MCNC: 0.65 MG/DL (ref 0.7–1.3)
DIASTOLIC BLOOD PRESSURE - MUSE: 69 MMHG
DIASTOLIC BLOOD PRESSURE - MUSE: NORMAL MMHG
ERYTHROCYTE [DISTWIDTH] IN BLOOD BY AUTOMATED COUNT: 13.2 % (ref 10–15)
GFR SERPL CREATININE-BSD FRML MDRD: >90 ML/MIN/1.73M2
GLUCOSE BLD-MCNC: 86 MG/DL (ref 70–125)
HCT VFR BLD AUTO: 32.1 % (ref 40–53)
HGB BLD-MCNC: 11 G/DL (ref 13.3–17.7)
INTERPRETATION ECG - MUSE: NORMAL
INTERPRETATION ECG - MUSE: NORMAL
MAGNESIUM SERPL-MCNC: 2.3 MG/DL (ref 1.8–2.6)
MCH RBC QN AUTO: 30.7 PG (ref 26.5–33)
MCHC RBC AUTO-ENTMCNC: 34.3 G/DL (ref 31.5–36.5)
MCV RBC AUTO: 90 FL (ref 78–100)
OSMOLALITY UR: 357 MMOL/KG (ref 100–1200)
P AXIS - MUSE: 35 DEGREES
P AXIS - MUSE: 45 DEGREES
PLATELET # BLD AUTO: 80 10E3/UL (ref 150–450)
POTASSIUM BLD-SCNC: 3.9 MMOL/L (ref 3.5–5)
POTASSIUM BLD-SCNC: 3.9 MMOL/L (ref 3.5–5)
PR INTERVAL - MUSE: 170 MS
PR INTERVAL - MUSE: 198 MS
PROT SERPL-MCNC: 5.7 G/DL (ref 6–8)
QRS DURATION - MUSE: 90 MS
QRS DURATION - MUSE: 94 MS
QT - MUSE: 336 MS
QT - MUSE: 388 MS
QTC - MUSE: 448 MS
QTC - MUSE: 461 MS
R AXIS - MUSE: -8 DEGREES
R AXIS - MUSE: 11 DEGREES
RBC # BLD AUTO: 3.58 10E6/UL (ref 4.4–5.9)
SODIUM SERPL-SCNC: 134 MMOL/L (ref 136–145)
SYSTOLIC BLOOD PRESSURE - MUSE: 103 MMHG
SYSTOLIC BLOOD PRESSURE - MUSE: NORMAL MMHG
T AXIS - MUSE: -3 DEGREES
T AXIS - MUSE: 33 DEGREES
TROPONIN I SERPL-MCNC: 0.25 NG/ML (ref 0–0.29)
VENTRICULAR RATE- MUSE: 107 BPM
VENTRICULAR RATE- MUSE: 85 BPM
WBC # BLD AUTO: 4.6 10E3/UL (ref 4–11)

## 2022-07-11 PROCEDURE — 85027 COMPLETE CBC AUTOMATED: CPT | Performed by: INTERNAL MEDICINE

## 2022-07-11 PROCEDURE — 99233 SBSQ HOSP IP/OBS HIGH 50: CPT | Performed by: INTERNAL MEDICINE

## 2022-07-11 PROCEDURE — 87798 DETECT AGENT NOS DNA AMP: CPT | Performed by: INTERNAL MEDICINE

## 2022-07-11 PROCEDURE — 99223 1ST HOSP IP/OBS HIGH 75: CPT | Performed by: INTERNAL MEDICINE

## 2022-07-11 PROCEDURE — 250N000013 HC RX MED GY IP 250 OP 250 PS 637: Performed by: INTERNAL MEDICINE

## 2022-07-11 PROCEDURE — 258N000003 HC RX IP 258 OP 636: Performed by: INTERNAL MEDICINE

## 2022-07-11 PROCEDURE — 93010 ELECTROCARDIOGRAM REPORT: CPT | Performed by: GENERAL ACUTE CARE HOSPITAL

## 2022-07-11 PROCEDURE — 93005 ELECTROCARDIOGRAM TRACING: CPT | Performed by: INTERNAL MEDICINE

## 2022-07-11 PROCEDURE — 93005 ELECTROCARDIOGRAM TRACING: CPT

## 2022-07-11 PROCEDURE — C9113 INJ PANTOPRAZOLE SODIUM, VIA: HCPCS | Performed by: INTERNAL MEDICINE

## 2022-07-11 PROCEDURE — 200N000001 HC R&B ICU

## 2022-07-11 PROCEDURE — 250N000011 HC RX IP 250 OP 636: Performed by: INTERNAL MEDICINE

## 2022-07-11 PROCEDURE — 97116 GAIT TRAINING THERAPY: CPT | Mod: GP

## 2022-07-11 PROCEDURE — 84484 ASSAY OF TROPONIN QUANT: CPT | Performed by: INTERNAL MEDICINE

## 2022-07-11 PROCEDURE — 83735 ASSAY OF MAGNESIUM: CPT | Performed by: INTERNAL MEDICINE

## 2022-07-11 PROCEDURE — 97161 PT EVAL LOW COMPLEX 20 MIN: CPT | Mod: GP

## 2022-07-11 PROCEDURE — 99233 SBSQ HOSP IP/OBS HIGH 50: CPT | Performed by: FAMILY MEDICINE

## 2022-07-11 PROCEDURE — 80053 COMPREHEN METABOLIC PANEL: CPT | Performed by: INTERNAL MEDICINE

## 2022-07-11 RX ORDER — PANTOPRAZOLE SODIUM 40 MG/1
40 TABLET, DELAYED RELEASE ORAL
Status: DISCONTINUED | OUTPATIENT
Start: 2022-07-12 | End: 2022-07-13 | Stop reason: HOSPADM

## 2022-07-11 RX ORDER — POTASSIUM CHLORIDE 1500 MG/1
40 TABLET, EXTENDED RELEASE ORAL ONCE
Status: COMPLETED | OUTPATIENT
Start: 2022-07-11 | End: 2022-07-11

## 2022-07-11 RX ADMIN — PANTOPRAZOLE SODIUM 40 MG: 40 INJECTION, POWDER, FOR SOLUTION INTRAVENOUS at 08:01

## 2022-07-11 RX ADMIN — DOXYCYCLINE 100 MG: 100 INJECTION, POWDER, LYOPHILIZED, FOR SOLUTION INTRAVENOUS at 06:04

## 2022-07-11 RX ADMIN — SODIUM CHLORIDE: 9 INJECTION, SOLUTION INTRAVENOUS at 20:20

## 2022-07-11 RX ADMIN — POTASSIUM CHLORIDE 40 MEQ: 1500 TABLET, EXTENDED RELEASE ORAL at 01:19

## 2022-07-11 RX ADMIN — DOXYCYCLINE 100 MG: 100 INJECTION, POWDER, LYOPHILIZED, FOR SOLUTION INTRAVENOUS at 19:02

## 2022-07-11 RX ADMIN — SODIUM CHLORIDE 500 ML: 9 INJECTION, SOLUTION INTRAVENOUS at 03:04

## 2022-07-11 ASSESSMENT — ACTIVITIES OF DAILY LIVING (ADL)
ADLS_ACUITY_SCORE: 20

## 2022-07-11 NOTE — PROGRESS NOTES
[facility]    Medicine Progress Note - Hospitalist Service    Date of Admission:  7/10/2022    Assessment & Plan      47-year-old male who was previously healthy came into the emergency room department with 9-day history of fever headache malaise and chest pain.  Admitted for further work-up and evaluation of SIRS in addition to significant EKG changes admitted to recent tick bite.    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    --- Presented with abnormal transaminases, thrombocytopenia  --- Smear PCR pending for Lyme, Anaplasma, Babesia  --- Continue doxycycline  --- Continue IV fluids  --- Blood cultures with no growth to date.  --- Lactic acid normal  --- Pro-Ori elevated    ACS  --- Presented with significant ST changes; initially Cath Lab activated but then felt to be possible myocarditis or secondary to tickborne illness such as Lyme disease  --- Cardiology following  --- Troponins negative  --- Considering cardiac MRI  --- Echo with EF 55%; inferior wall hypokinesis with posterior wall hypokinesis.    Elevated transaminases  --- Suspect related to tickborne illness in the setting of fever and tick bite, thrombocytopenia  --- Right upper quadrant ultrasound negative           Diet: Combination Diet Regular Diet    DVT Prophylaxis: Pneumatic Compression Devices  Berrios Catheter: Not present  Central Lines: PRESENT  PICC Triple Lumen 07/10/22 Right Basilic vasopressors-Site Assessment: WDL  Cardiac Monitoring: ACTIVE order. Indication: ICU  Code Status: Full Code      Disposition Plan      Expected Discharge Date: 07/13/2022,  3:00 PM              The patient's care was discussed with the Bedside Nurse and Patient.    Brigida Martins MD  Hospitalist Service  [facility]  Securely message with the Vocera Web Console (learn more here)  Text page via My[employer]er  Paging/Directory         Clinically Significant Risk Factors Present on Admission                      ______________________________________________________________________    Interval History   ---bp overall stable and responsive to NS bolus  ---tropnin negative  --- Tells me his high fevers are overall better.  He is much less achy.  He was certain he had COVID but admits to having these fevers for about a week prior to admission.  No chest pain no shortness of breath.    Data reviewed today: I reviewed all medications, new labs and imaging results over the last 24 hours. .    Physical Exam   Vital Signs: Temp: 98.9  F (37.2  C) Temp src: Oral BP: (!) 77/52 Pulse: 88   Resp: 20 SpO2: 97 %      Weight: 138 lbs 3.65 oz  General Appearance: Pleasant, no apparent distress  Respiratory: Clear to auscultation bilaterally  Cardiovascular: Regular rate and rhythm without significant murmurs rubs or gallops  GI: Soft and nontender without hepatosplenomegaly  Skin: Mild erythema surrounding tick bite in left lower quadrant of his abdomen.  Mild papular eruption not contiguous with this but does not look classically like erythema migrans  Other: Neurologically gross intact without focal deficits appreciate    Data   Recent Labs   Lab 07/11/22  0514 07/10/22  2249 07/10/22  2007 07/10/22  1550 07/10/22  1149 07/10/22  1054   WBC 4.6  --   --  5.3  5.3 6.4 5.5   HGB 11.0*  --   --  11.6*  11.6* 13.0* 14.5   MCV 90  --   --  87  87 88 88   PLT 80*  --   --  75*  75* 79* 82*   INR  --   --   --   --  1.30*  --    * 132*  --   --   --  129*   POTASSIUM 3.9  3.9 3.4*  --  3.7  --  3.3*   CHLORIDE 104 99  --   --   --  91*   CO2 23 27  --   --   --  28   BUN 11  --   --   --   --  17   CR 0.65*  --   --   --   --  0.93   ANIONGAP 7 6  --   --   --  10   LISA 7.8*  --   --   --   --  8.9   GLC 86  --  120*  --   --  122   ALBUMIN 2.0*  --   --   --   --  2.8*  2.8*   PROTTOTAL 5.7*  --   --   --   --  7.9  8.0    BILITOTAL 1.0  --   --   --   --  2.2*  2.2*   ALKPHOS 97  --   --   --   --  152*  151*   ALT 83*  --   --   --   --  150*  152*   AST 66*  --   --   --   --  141*  140*     Recent Results (from the past 24 hour(s))   US Abdomen Limited    Narrative    EXAM: US ABDOMEN LIMITED  LOCATION: [facility]  DATE/TIME: 7/10/2022 3:19 PM    INDICATION: hyperbilirubinemia, evaluate for cholecystitis  COMPARISON: None.  TECHNIQUE: Limited abdominal ultrasound.    FINDINGS:    GALLBLADDER: Normal. No gallstones, wall thickening, or pericholecystic fluid. Negative sonographic Gordon's sign.    BILE DUCTS: No biliary dilatation. The common duct measures 5 mm.    LIVER: Normal parenchyma with smooth contour. No focal mass.    RIGHT KIDNEY: No hydronephrosis.    PANCREAS: The visualized portions are normal.    No ascites.      Impression    IMPRESSION:  1.  Normal limited abdominal ultrasound.

## 2022-07-11 NOTE — PROGRESS NOTES
07/11/22 0845   Quick Adds   Type of Visit Initial PT Evaluation   Living Environment   People in Home spouse   Current Living Arrangements house   Home Accessibility no concerns  (reports no stairs)   Self-Care   Usual Activity Tolerance excellent   Current Activity Tolerance good   Equipment Currently Used at Home none   Activity/Exercise/Self-Care Comment per pt, he is independent with all activies at baseline   General Information   Onset of Illness/Injury or Date of Surgery 07/10/22   Referring Physician Jose Angel Medellin MD   Patient/Family Therapy Goals Statement (PT) none stated   Pertinent History of Current Problem (include personal factors and/or comorbidities that impact the POC) history of prior COVID-19 infection, tick bite at the end of June 2022, admitted with 9 days of fever, headache, malaise, and inspiratory epigastric/lower chest discomfort, found to have hypotension, tachycardia, thrombocytopenia, coagulopathy, hyponatremia, transaminase elevations, hyperbilirubinemia, and ECG changes   Cognition   Affect/Mental Status (Cognition) WFL   Orientation Status (Cognition) oriented x 4   Cognitive Status Comments slow at times with responses   Pain Assessment   Patient Currently in Pain Yes, see Vital Sign flowsheet  (headache)   Posture    Posture Not impaired   Range of Motion (ROM)   Range of Motion ROM is WNL   Strength (Manual Muscle Testing)   Strength (Manual Muscle Testing) strength is WFL   Bed Mobility   Bed Mobility supine-sit;sit-supine   Supine-Sit Pelican (Bed Mobility) modified independence   Sit-Supine Pelican (Bed Mobility) modified independence   Comment, (Bed Mobility) with rail, HOB elevated, minimal light headedness upon sitting   Transfers   Transfers sit-stand transfer   Sit-Stand Transfer   Sit-Stand Pelican (Transfers) supervision   Assistive Device (Sit-Stand Transfers)   (none)   Comment, (Sit-Stand Transfer) reporting no dizziness   Gait/Stairs  (Locomotion)   [place] Level (Gait) supervision   Assistive Device (Gait) other (see comments)  (none)   Distance in Feet (Required for LE Total Joints) 150'   Comment, (Gait/Stairs) slow pace but good stability. does not have stairs at home, not completed   Balance   Balance no deficits were identified   Clinical Impression   Criteria for Skilled Therapeutic Intervention Yes, treatment indicated   PT Diagnosis (PT) gait abnormality   Influenced by the following impairments decreased endurance   Functional limitations due to impairments gait   Clinical Presentation (PT Evaluation Complexity) Stable/Uncomplicated   Clinical Presentation Rationale pt presents as medically diagnosed   Clinical Decision Making (Complexity) low complexity   Planned Therapy Interventions (PT) gait training   Anticipated Equipment Needs at Discharge (PT) other (see comments)  (none)   Risk & Benefits of therapy have been explained evaluation/treatment results reviewed;patient   PT Discharge Planning   PT Discharge Recommendation (DC Rec) home with assist   Total Evaluation Time   Total Evaluation Time (Minutes) 10   Physical Therapy Goals   PT Frequency One time eval and treatment only   PT Predicted Duration/Target Date for Goal Attainment 07/11/22   PT Goals PT Goal 1   PT: Goal 1 pt will show independence in managing IV pole during ambulation/mobility for amb w/o staff assist.

## 2022-07-11 NOTE — PROGRESS NOTES
CRITICAL CARE PROGRESS NOTE:    Assessment/Plan:  47 year old male former smoker with a history of prior COVID-19 infection, tick bite at the end of June 2022, admitted with 9 days of fever, headache, malaise, and inspiratory epigastric/lower chest discomfort, found to have hypotension, tachycardia, thrombocytopenia, coagulopathy, hyponatremia, transaminase elevations, hyperbilirubinemia, and ECG changes.     RESP:  No acute issues. Nonspecific elevated D-dimer. Tachycardic. No RV dilation on TTE, IVC collapsing >50%, not hypoxic; other clinical findings not consistent with PE.    Monitor    Currently on room air     CV:  Abnormal ECG and TTE: Marked ST changes, no angina. Troponin 0.12, 0.06, 0.25. Suspect myocarditis from anaplasmosis or Lyme disease. Does have possible WMA on TTE. Collapsing IVC on TTE.    Appreciate cardiology consultation    On IV fluids, encourage PO intake    Has not required pressors    Repeat troponin and ECG this AM    No anginal symptoms     NEURO:  Headache. Risk of CNS involvement with anaplasma or Lyme but no meningeal signs, cognition normal. Head CT unremarkable. Headache nonspecific but common with anaplasmosis. Headache improving.    Monitor    Acetaminophen prn     GI:  Transaminase elevation, hyperbilirubinemia. 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 DDx includes cholecystitis, shock liver. RUQ US normal. Transaminases improving, bili normalized.    Supportive care    RUQ US    Follow LFTs     RENAL:  Hyponatremia, hypokalemia. Likely SIADH, low intake, hypovolemia. Improving.    IV fluids and PO intake    Electrolyte replacement     ID:  Fever. 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 Small erythematous papule LLQ with scab, must have attached for some time. No erythema migrans. Procalcitonin 2.1 and . 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 Patient is reportedly vaccinated against COVID-19 and reports he  "had COVID-19 twice. His wife current has covid but the patient has had multiple recent negative covid tests including on 7/7 at [facility] urgent care and again on 7/10. UA without evidence of infection.    doxycycline    Smear for anaplasma, babesia    Lyme serologies    Blood cultures    UA-reflex UC     HEMATOLOGIC:  Thrombocytopenia, coagulopathy. 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 DDx includes DIC though fibrinogen elevated. CTD such as SLE less likely, though does have some features.    Peripheral smear    SALAS    Supportive care     ENDOCRINE:  Euglycemic.    monitor     ICU PROPHYLAXIS:    SCDs    PPI     Lines/Drains/Tubes:  RUE PICC placed on 7/10     CODE STATUS, DISPOSITION, FAMILY COMMUNICATION: Full code. Clinically stable to transfer out of ICU to cardiac telemetry. Hospitalist service consulted and spoke with Dr. Martins on 7/10. Critical care service will sign off but please call any time if needed. Communicated directly with the patient; did not contact family.     Restraints  Not indicated    Jose Angel Medellin MD  Pulmonary and Critical Care Medicine  [facility]  Office [phone]  Pager [phone]  (he/him/his)    Overnight events:  No new events.    Subjective:  Headache improved. Denies dyspnea, chest pain.    Objective:  Physical Exam:  Vent settings for last 24 hours:  Resp: 25      BP 99/74   Pulse 85   Temp 98.7  F (37.1  C)   Resp 25   Ht 1.676 m (5' 6\")   Wt 62.7 kg (138 lb 3.7 oz)   SpO2 97%   BMI 22.31 kg/m      Intake/Output last 3 shifts:  I/O last 3 completed shifts:  In: 3784.25 [P.O.:720; I.V.:564.25; IV Piggyback:2500]  Out: 1200 [Urine:1200]  Intake/Output this shift:  I/O this shift:  In: 131 [I.V.:131]  Out: -     Physical Exam  Gen: alert, oriented, no distress  HEENT: no OP lesions, no AG  CV: RRR, no m/g/r  Resp: CTAB  Abd: soft, nontender, BS+  Neuro: PERRL, nonfocal  Ext: no edema  Skin: small erythematous papule with central scab LLQ of abdomen, another one right lateral " thigh, no drainage    LAB:  Recent Labs   Lab 07/11/22 0514   WBC 4.6   HGB 11.0*   HCT 32.1*   PLT 80*     Recent Labs   Lab 07/11/22  0514 07/10/22  2249 07/10/22  1054   * 132* 129*   CO2 23 27 28   BUN 11  --  17   ALKPHOS 97  --  152*  151*   ALT 83*  --  150*  152*   AST 66*  --  141*  140*       No current outpatient medications on file.       Care Time: 35 Minutes

## 2022-07-11 NOTE — UTILIZATION REVIEW
Inpatient appropriate    Admission Status; Secondary Review Determination       Under the authority of the Utilization Management Committee, the utilization review process indicated a secondary review on the above patient. The review outcome is based on review of the medical records, discussions with staff, and applying clinical experience noted on the date of the review.     (x) Inpatient Status Appropriate - This patient's medical care is consistent with medical management for inpatient care and reasonable inpatient medical practice.     RATIONALE FOR DETERMINATION   47 year old male former smoker with a history of prior COVID-19 infection, tick bite at the end of June 2022, admitted with 9 days of fever, headache, malaise, and inspiratory epigastric/lower chest discomfort, found to have hypotension, tachycardia, thrombocytopenia, coagulopathy, hyponatremia, transaminase elevations, hyperbilirubinemia, and ECG changes.  Patient has elevated troponin and ST changes on EKG concern for myocarditis from anaplasmosis or lyme disease.  Currently patient on IVF and iv doxycycline.     At the time of admission with the information available to the attending physician more than 2 nights Hospital complex care was anticipated, based on patient risk of adverse outcome if treated as outpatient and complex care required. Inpatient admission is appropriate based on the Medicare guidelines.     The information on this document is developed by the utilization review team in order for the business office to ensure compliance. This only denotes the appropriateness of proper admission status and does not reflect the quality of care rendered.   The definitions of Inpatient Status and Observation Status used in making the determination above are those provided in the CMS Coverage Manual, Chapter 1 and Chapter 6, section 70.4.   Sincerely,   Roel Hackett MD  Utilization Review  Physician Advisor  [facility].

## 2022-07-11 NOTE — PROCEDURES
"  Pt. Name: Calixto Murcia  MRN:        [id number]    Procedure: Insertion of a  triple Lumen  5 fr  Bard SOLO (valved) Power PICC, Lot number [id number]    Indications: Vasopressors    Contraindications : none    Procedure Details     Patient identified with 2 identifiers and \"Time Out\" conducted.  .     Central line insertion bundle followed: hand hygiene performed prior to procedure, site cleansed with cholraprep, hat, mask, sterile gloves,sterile gown worn, patient draped with maximum barrier head to toe drape, sterile field maintained.    The vein was assessed and found to be compressible and of adequate size. 4 ml 1% Lidocaine administered sq to the insertion site. A 5 Fr PICC was inserted into the basilic vein of the right arm with ultrasound guidance. one attempt(s) required to access vein.   Catheter threaded without difficulty. Good blood return noted.    Modified Seldinger Technique used for insertion.    The 8 sharps that are included in the PICC insertion kit were accounted for and disposed of in the sharps container prior to breakdown of the sterile field.    Catheter secured with Statlock, biopatch and Tegaderm dressing applied.    Findings:  Total catheter length  43 cm, with 3 cm exposed. Mid upper arm circumference is 28 cm. Catheter was flushed with 30 cc NS. Patient  tolerated procedure well.    Tip placement verified by 3CG.    CLABSI prevention brochure left at bedside.    Patient's primary RN notified PICC is ready for use.    Comments:        Kristel Renae RN    Vascular Access - [facility]      "

## 2022-07-11 NOTE — CONSULTS
"  HEART CARE NOTE          Assessment/Recommendations     1. ST elevation  Assessment / Plan    Patient denies any current of prior chest pain; coronary angiogram aborted due to low suspicion for cardiac ischemia given unremarkable troponin and atypical chest discomfort while ? DIC and other underlying concerns are addressed - consider cardiac MRI once underlying concerns resolved    Continue to follow hemodynamics closely and monitor on telemetry    2. Abnormal ECG and TTE  Assessment / Plan    ? Myocarditis in the setting of fevers and tick bite    3. Tick bite/fevers  Assessment / Plan    Management per ID and primary team    History of Present Illness/Subjective    Mr. Calixto Murcia is a 47 year old male with a PMHx significant for COVID-19 infection, tick bite at the end of June 2022, admitted with 9 days of fever, headache, malaise, and inspiratory epigastric/lower chest discomfort, found to have hypotension, tachycardia, thrombocytopenia, coagulopathy, hyponatremia, transaminase elevations, hyperbilirubinemia, and ECG changes.    Today, Mr. Murcia denies any acute cardiac events or complaints; Management plan as detailed above     ECG: Personally reviewed. Diffuse ST elevations.    ECHO (personnaly Reviewed):  Interpretation Summary     The left ventricle is normal in size.  The visual ejection fraction is 50-55%.  There is mild to moderate inferior wall hypokinesis.  Posterior wall appears hypokinetic.  Suspect normal RVEF  The right ventricular systolic pressure is approximated at 19mmHg plus the  right atrial pressure.  IVC diameter <2.1 cm collapsing >50% with sniff suggests a normal RA pressure  of 3 mmHg.  Normal size ascending aorta.  Trivial pericardial effusion  The rhythm was sinus tachycardia.        Physical Examination Review of Systems   BP 97/72   Pulse 85   Temp 98.4  F (36.9  C) (Oral)   Resp 24   Ht 1.676 m (5' 6\")   Wt 62.7 kg (138 lb 3.7 oz)   SpO2 97%   BMI 22.31 kg/m    Body mass index " is 22.31 kg/m .  Wt Readings from Last 3 Encounters:   07/11/22 62.7 kg (138 lb 3.7 oz)     General Appearance:   no distress, normal body habitus   ENT/Mouth: membranes moist, no oral lesions or bleeding gums.      EYES:  no scleral icterus, normal conjunctivae   Neck: no carotid bruits or thyromegaly   Chest/Lungs:   lungs are clear to auscultation, no rales or wheezing, equal chest wall expansion    Cardiovascular:   Regular. Normal first and second heart sounds with no murmurs, rubs, or gallops; the carotid, radial and posterior tibial pulses are intact, no JVD and LE edema bilaterally    Abdomen:  no organomegaly, masses, bruits, or tenderness; bowel sounds are present   Extremities: no cyanosis or clubbing   Skin: no xanthelasma, warm.    Neurologic: alert and oriented x3, calm     Psychiatric: alert and oriented x3, calm     A complete 10 systems ROS was reviewed  And is negative except what is listed in the HPI.          Medical History  Surgical History Family History Social History   History reviewed. No pertinent past medical history. Past Surgical History:   Procedure Laterality Date     PICC TRIPLE LUMEN PLACEMENT  7/10/2022         no family history of premature coronary artery disease Social History     Socioeconomic History     Marital status: Single     Spouse name: Not on file     Number of children: Not on file     Years of education: Not on file     Highest education level: Not on file   Occupational History     Not on file   Tobacco Use     Smoking status: Not on file     Smokeless tobacco: Not on file   Substance and Sexual Activity     Alcohol use: Not on file     Drug use: Not on file     Sexual activity: Not on file   Other Topics Concern     Not on file   Social History Narrative     Not on file     Social Determinants of Health     Financial Resource Strain: Not on file   Food Insecurity: Not on file   Transportation Needs: Not on file   Physical Activity: Not on file   Stress: Not on file    Social Connections: Not on file   Intimate Partner Violence: Not on file   Housing Stability: Not on file           Lab Results    Chemistry/lipid CBC Cardiac Enzymes/BNP/TSH/INR   Lab Results   Component Value Date    BUN 11 07/11/2022     (L) 07/11/2022    CO2 23 07/11/2022    Lab Results   Component Value Date    WBC 4.6 07/11/2022    HGB 11.0 (L) 07/11/2022    HCT 32.1 (L) 07/11/2022    MCV 90 07/11/2022    PLT 80 (L) 07/11/2022    Lab Results   Component Value Date    TROPONINI 0.06 07/10/2022    INR 1.30 (H) 07/10/2022     Lab Results   Component Value Date    TROPONINI 0.06 07/10/2022          Weight:    Wt Readings from Last 3 Encounters:   07/11/22 62.7 kg (138 lb 3.7 oz)       Allergies  Allergies   Allergen Reactions     Colton Flavor          Surgical History  Past Surgical History:   Procedure Laterality Date     PICC TRIPLE LUMEN PLACEMENT  7/10/2022            Social History  Tobacco:   History   Smoking Status     Not on file   Smokeless Tobacco     Not on file    Alcohol:   Social History    Substance and Sexual Activity      Alcohol use: Not on file   Illicit Drugs:   History   Drug Use Not on file       Family History  No family history on file.       Tanya Mathews MD on 7/11/2022      cc: No Ref-Primary, Physician

## 2022-07-11 NOTE — PLAN OF CARE
Goal Outcome Evaluation:    [facility] - ICU    RN Progress Note:            Pertinent Assessments:      Please refer to flowsheet rows for full assessment     SBP <90         Mobility Level:     5         Key Events - This Shift:     500cc NS fluid bolus x2 for SBP <90              Plan:              Point of Contact Update NO  If No, reason: night shift, patient was in contact with family  Name:.  Phone Number:.  Summary of Conversation: .

## 2022-07-12 LAB
ALBUMIN SERPL-MCNC: 1.9 G/DL (ref 3.5–5)
ALP SERPL-CCNC: 93 U/L (ref 45–120)
ALT SERPL W P-5'-P-CCNC: 72 U/L (ref 0–45)
ANA SER QL IF: NEGATIVE
ANION GAP SERPL CALCULATED.3IONS-SCNC: 7 MMOL/L (ref 5–18)
AST SERPL W P-5'-P-CCNC: 58 U/L (ref 0–40)
BILIRUB SERPL-MCNC: 0.5 MG/DL (ref 0–1)
BUN SERPL-MCNC: 7 MG/DL (ref 8–22)
CALCIUM SERPL-MCNC: 7.9 MG/DL (ref 8.5–10.5)
CHLORIDE BLD-SCNC: 106 MMOL/L (ref 98–107)
CO2 SERPL-SCNC: 25 MMOL/L (ref 22–31)
CREAT SERPL-MCNC: 0.64 MG/DL (ref 0.7–1.3)
ERYTHROCYTE [DISTWIDTH] IN BLOOD BY AUTOMATED COUNT: 13.6 % (ref 10–15)
GFR SERPL CREATININE-BSD FRML MDRD: >90 ML/MIN/1.73M2
GLUCOSE BLD-MCNC: 90 MG/DL (ref 70–125)
HCT VFR BLD AUTO: 33.7 % (ref 40–53)
HGB BLD-MCNC: 11.2 G/DL (ref 13.3–17.7)
MAGNESIUM SERPL-MCNC: 2.2 MG/DL (ref 1.8–2.6)
MCH RBC QN AUTO: 30.3 PG (ref 26.5–33)
MCHC RBC AUTO-ENTMCNC: 33.2 G/DL (ref 31.5–36.5)
MCV RBC AUTO: 91 FL (ref 78–100)
PATH REPORT.COMMENTS IMP SPEC: NORMAL
PATH REPORT.FINAL DX SPEC: NORMAL
PATH REPORT.MICROSCOPIC SPEC OTHER STN: NORMAL
PATH REPORT.RELEVANT HX SPEC: NORMAL
PLATELET # BLD AUTO: 128 10E3/UL (ref 150–450)
POTASSIUM BLD-SCNC: 3.5 MMOL/L (ref 3.5–5)
PROT SERPL-MCNC: 6 G/DL (ref 6–8)
RBC # BLD AUTO: 3.7 10E6/UL (ref 4.4–5.9)
SODIUM SERPL-SCNC: 138 MMOL/L (ref 136–145)
WBC # BLD AUTO: 4.2 10E3/UL (ref 4–11)

## 2022-07-12 PROCEDURE — 250N000013 HC RX MED GY IP 250 OP 250 PS 637: Performed by: FAMILY MEDICINE

## 2022-07-12 PROCEDURE — 85027 COMPLETE CBC AUTOMATED: CPT | Performed by: FAMILY MEDICINE

## 2022-07-12 PROCEDURE — 200N000001 HC R&B ICU

## 2022-07-12 PROCEDURE — 99232 SBSQ HOSP IP/OBS MODERATE 35: CPT | Performed by: INTERNAL MEDICINE

## 2022-07-12 PROCEDURE — 83735 ASSAY OF MAGNESIUM: CPT | Performed by: INTERNAL MEDICINE

## 2022-07-12 PROCEDURE — 258N000003 HC RX IP 258 OP 636: Performed by: INTERNAL MEDICINE

## 2022-07-12 PROCEDURE — 99233 SBSQ HOSP IP/OBS HIGH 50: CPT | Performed by: INTERNAL MEDICINE

## 2022-07-12 PROCEDURE — 250N000011 HC RX IP 250 OP 636: Performed by: INTERNAL MEDICINE

## 2022-07-12 PROCEDURE — 80053 COMPREHEN METABOLIC PANEL: CPT | Performed by: FAMILY MEDICINE

## 2022-07-12 PROCEDURE — 82040 ASSAY OF SERUM ALBUMIN: CPT | Performed by: FAMILY MEDICINE

## 2022-07-12 RX ORDER — POTASSIUM CHLORIDE 29.8 MG/ML
20 INJECTION INTRAVENOUS ONCE
Status: COMPLETED | OUTPATIENT
Start: 2022-07-12 | End: 2022-07-12

## 2022-07-12 RX ADMIN — SODIUM CHLORIDE: 9 INJECTION, SOLUTION INTRAVENOUS at 09:29

## 2022-07-12 RX ADMIN — SODIUM CHLORIDE: 9 INJECTION, SOLUTION INTRAVENOUS at 23:00

## 2022-07-12 RX ADMIN — POTASSIUM CHLORIDE 20 MEQ: 29.8 INJECTION, SOLUTION INTRAVENOUS at 08:07

## 2022-07-12 RX ADMIN — DOXYCYCLINE 100 MG: 100 INJECTION, POWDER, LYOPHILIZED, FOR SOLUTION INTRAVENOUS at 17:47

## 2022-07-12 RX ADMIN — DOXYCYCLINE 100 MG: 100 INJECTION, POWDER, LYOPHILIZED, FOR SOLUTION INTRAVENOUS at 05:30

## 2022-07-12 RX ADMIN — PANTOPRAZOLE SODIUM 40 MG: 40 TABLET, DELAYED RELEASE ORAL at 08:07

## 2022-07-12 ASSESSMENT — ACTIVITIES OF DAILY LIVING (ADL)
ADLS_ACUITY_SCORE: 20

## 2022-07-12 NOTE — PROGRESS NOTES
[facility]    Medicine Progress Note - Hospitalist Service    Date of Admission:  7/10/2022    Assessment & Plan          47-year-old male who was previously healthy came into the emergency room department with 9-day history of fever headache malaise and chest pain.  Admitted for further work-up and evaluation of SIRS in addition to significant EKG changes admitted to recent tick bite.     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  Presented with abnormal transaminases, thrombocytopenia. Lactic acid normal and Pro-Ori elevated  - Smear PCR pending for Lyme, Anaplasma, Babesia. Follow up results  - Continue doxycycline  - Continue IV fluids  - Blood cultures with no growth to date.     ACS: Presented with significant ST changes; initially Cath Lab activated but then felt to be possible myocarditis or secondary to tickborne illness such as Lyme disease. Troponins negative. Echo with EF 55%; inferior wall hypokinesis with posterior wall hypokinesis.  - Cardiology input appreciated  - Considering cardiac MRI outpatient     Elevated transaminases: Suspect related to tickborne illness in the setting of fever and tick bite, thrombocytopenia. Right upper quadrant ultrasound negative  - Transaminases now improved. Continue to monitor.       Diet: Combination Diet Regular Diet    DVT Prophylaxis: Low Risk/Ambulatory with no VTE prophylaxis indicated  Berrios Catheter: Not present  Central Lines: PRESENT  PICC Triple Lumen 07/10/22 Right Basilic vasopressors-Site Assessment: WDL  Cardiac Monitoring: ACTIVE order. Indication: ICU  Code Status: Full Code      Disposition Plan     Expected Discharge Date: 07/13/2022,  3:00 PM              The patient's care was discussed with the Bedside Nurse, Care Coordinator/ and Patient.    Huan Dyer MD  Hospitalist Service  [facility]  [facility]  Securely message with the [employer] Web Console (learn more here)  Text page via [employer] Paging/Directory       ______________________________________________________________________    Interval History   Patient reports that he feels better overall. He is happy that his fever subsided. He continues to feel weak.No chest pain.     Data reviewed today: I reviewed all medications, new labs and imaging results over the last 24 hours.     Physical Exam   Vital Signs: Temp: 98.1  F (36.7  C) Temp src: Oral BP: 107/75 Pulse: 75   Resp: 18 SpO2: 98 % O2 Device: None (Room air) Oxygen Delivery: 2 LPM  Weight: 139 lbs 12.35 oz    General appearance: not in acute distress  HEENT: PERRL, EOMI  Lungs: Clear breath sounds in bilateral lung fields  Cardiovascular: Regular rate and rhythm, normal S1-S2  Abdomen: Soft, non tender, no distension  Musculoskeletal: No joint swelling  Skin: No rash and no edema  Neurology: AAO ×3.  Cranial nerves II - XII normal.  Normal muscle strength in all four extremities.    Data   Recent Labs   Lab 07/12/22  0529 07/11/22  0514 07/10/22  2249 07/10/22  2007 07/10/22  1550 07/10/22  1149 07/10/22  1054   WBC 4.2 4.6  --   --  5.3  5.3 6.4 5.5   HGB 11.2* 11.0*  --   --  11.6*  11.6* 13.0* 14.5   MCV 91 90  --   --  87  87 88 88   * 80*  --   --  75*  75* 79* 82*   INR  --   --   --   --   --  1.30*  --     134* 132*  --   --   --  129*   POTASSIUM 3.5 3.9  3.9 3.4*  --  3.7  --  3.3*   CHLORIDE 106 104 99  --   --   --  91*   CO2 25 23 27  --   --   --  28   BUN 7* 11  --   --   --   --  17   CR 0.64* 0.65*  --   --   --   --  0.93   ANIONGAP 7 7 6  --   --   --  10   LISA 7.9* 7.8*  --   --   --   --  8.9   GLC 90 86  --  120*  --   --  122   ALBUMIN 1.9* 2.0*  --   --   --   --  2.8*  2.8*   PROTTOTAL 6.0 5.7*  --   --   --   --  7.9  8.0   BILITOTAL 0.5 1.0  --   --   --   --  2.2*  2.2*   ALKPHOS 93 97  --   --   --   --  152*  151*   ALT 72* 83*  --   --    --   --  150*  152*   AST 58* 66*  --   --   --   --  141*  140*

## 2022-07-12 NOTE — PLAN OF CARE
Goal Outcome Evaluation:    Plan of Care Reviewed With: patient     Overall Patient Progress: improving    Outcome Evaluation: Pt's BP stable with occasional soft BP. Afebrile. No c/o pain. Cough has subsided. Pt fatigued and was sleeping most of shift. Walked with PT in hallway. Appetite decresased but did eat lunch and dinner from meals brought from home.    [facility] - ICU    RN Progress Note:            Pertinent Assessments:      Please refer to flowsheet rows for full assessment     BP stable         Mobility Level:     SBA/Independent         Key Events - This Shift:     Pt slept most of shift, feels fatigued. Pt was made cardiac tele status. Continues to have ST elevation.              Plan:     IIV abx. Monitor BP, temp and ST elevation         Point of Contact Update YES-OR-NO: N/A  If No, reason: Pt makes owns decisions  Name:  Phone Number:  Summary of Conversation:

## 2022-07-12 NOTE — PROGRESS NOTES
HEART CARE NOTE          Assessment/Recommendations     1. ST elevation  Assessment / Plan    Patient denies any current of prior chest pain; coronary angiogram aborted due to low suspicion for cardiac ischemia given unremarkable troponin and atypical chest discomfort while ? DIC and other underlying concerns are addressed - consider cardiac MRI once underlying concerns resolved    Continue to follow hemodynamics closely and monitor on telemetry     2. Abnormal ECG and TTE  Assessment / Plan    ? Myocarditis in the setting of fevers and tick bite     3. Tick bite/fevers  Assessment / Plan    Management per ID and primary team    Cardiology team will sign-off for now. Please do not hesitate toconsult us again if new questions or concerns arise.        History of Present Illness/Subjective      Mr. Calixto Murcia is a 47 year old male with a PMHx significant for COVID-19 infection, tick bite at the end of June 2022, admitted with 9 days of fever, headache, malaise, and inspiratory epigastric/lower chest discomfort, found to have hypotension, tachycardia, thrombocytopenia, coagulopathy, hyponatremia, transaminase elevations, hyperbilirubinemia, and ECG changes.     Today, Mr. Murcia denies any acute cardiac events or complaints; Management plan as detailed above      ECG: Personally reviewed. Diffuse ST elevations.     ECHO (personnaly Reviewed):  Interpretation Summary     The left ventricle is normal in size.  The visual ejection fraction is 50-55%.  There is mild to moderate inferior wall hypokinesis.  Posterior wall appears hypokinetic.  Suspect normal RVEF  The right ventricular systolic pressure is approximated at 19mmHg plus the  right atrial pressure.  IVC diameter <2.1 cm collapsing >50% with sniff suggests a normal RA pressure  of 3 mmHg.  Normal size ascending aorta.  Trivial pericardial effusion  The rhythm was sinus tachycardia.          Physical Examination Review of Systems   /82   Pulse 71   Temp  "98.4  F (36.9  C) (Oral)   Resp 14   Ht 1.676 m (5' 6\")   Wt 62.7 kg (138 lb 3.7 oz)   SpO2 98%   BMI 22.31 kg/m    Body mass index is 22.31 kg/m .  Wt Readings from Last 3 Encounters:   07/11/22 62.7 kg (138 lb 3.7 oz)     General Appearance:   no distress, normal body habitus   ENT/Mouth: membranes moist, no oral lesions or bleeding gums.      EYES:  no scleral icterus, normal conjunctivae   Neck: no carotid bruits or thyromegaly   Chest/Lungs:   lungs are clear to auscultation, no rales or wheezing, equal chest wall expansion    Cardiovascular:   Regular. Normal first and second heart sounds with no murmurs, rubs, or gallops; the carotid, radial and posterior tibial pulses are intact, no JVD or LE edema bilaterally    Abdomen:  no organomegaly, masses, bruits, or tenderness; bowel sounds are present   Extremities: no cyanosis or clubbing   Skin: no xanthelasma, warm.    Neurologic: alert and oriented x3, calm     Psychiatric: alert and oriented x3, calm     A complete 10 systems ROS was reviewed  And is negative except what is listed in the HPI.          Medical History  Surgical History Family History Social History   History reviewed. No pertinent past medical history. Past Surgical History:   Procedure Laterality Date     PICC TRIPLE LUMEN PLACEMENT  7/10/2022         no family history of premature coronary artery disease Social History     Socioeconomic History     Marital status: Single     Spouse name: Not on file     Number of children: Not on file     Years of education: Not on file     Highest education level: Not on file   Occupational History     Not on file   Tobacco Use     Smoking status: Former Smoker     Smokeless tobacco: Not on file   Substance and Sexual Activity     Alcohol use: Not on file     Drug use: Not on file     Sexual activity: Not on file   Other Topics Concern     Not on file   Social History Narrative     Not on file     Social Determinants of Health     Financial Resource " Strain: Not on file   Food Insecurity: Not on file   Transportation Needs: Not on file   Physical Activity: Not on file   Stress: Not on file   Social Connections: Not on file   Intimate Partner Violence: Not on file   Housing Stability: Not on file           Lab Results    Chemistry/lipid CBC Cardiac Enzymes/BNP/TSH/INR   Lab Results   Component Value Date    BUN 11 07/11/2022     (L) 07/11/2022    CO2 23 07/11/2022    Lab Results   Component Value Date    WBC 4.6 07/11/2022    HGB 11.0 (L) 07/11/2022    HCT 32.1 (L) 07/11/2022    MCV 90 07/11/2022    PLT 80 (L) 07/11/2022    Lab Results   Component Value Date    TROPONINI 0.25 07/11/2022    INR 1.30 (H) 07/10/2022     Lab Results   Component Value Date    TROPONINI 0.25 07/11/2022          Weight:    Wt Readings from Last 3 Encounters:   07/11/22 62.7 kg (138 lb 3.7 oz)       Allergies  Allergies   Allergen Reactions     Colton Flavor          Surgical History  Past Surgical History:   Procedure Laterality Date     PICC TRIPLE LUMEN PLACEMENT  7/10/2022            Social History  Tobacco:   History   Smoking Status     Former Smoker   Smokeless Tobacco     Not on file    Alcohol:   Social History    Substance and Sexual Activity      Alcohol use: Not on file   Illicit Drugs:   History   Drug Use Not on file       Family History  No family history on file.       Tanya Mathews MD on 7/12/2022      cc: No Ref-Primary, Physician

## 2022-07-12 NOTE — PLAN OF CARE
Goal Outcome Evaluation:     [facility] - ICU    RN Progress Note:            Pertinent Assessments:      Please refer to flowsheet rows for full assessment     VSS, eating food from  home.          Mobility Level:     independent         Key Events - This Shift:     Family visited, stable this shift              Plan:     Waiting for bed to transfer off ICU         Point of Contact Update YES-OR-NO: Yes  If No, reason:   Name:Jaime  Phone Number:in chart  Summary of Conversation: update on pt condition

## 2022-07-13 VITALS
HEIGHT: 66 IN | TEMPERATURE: 97.8 F | BODY MASS INDEX: 22.46 KG/M2 | HEART RATE: 80 BPM | WEIGHT: 139.77 LBS | RESPIRATION RATE: 23 BRPM | DIASTOLIC BLOOD PRESSURE: 77 MMHG | SYSTOLIC BLOOD PRESSURE: 111 MMHG | OXYGEN SATURATION: 99 %

## 2022-07-13 LAB
A PHAGOCYTOPH DNA BLD QL NAA+PROBE: DETECTED
ALBUMIN SERPL-MCNC: 1.9 G/DL (ref 3.5–5)
ALP SERPL-CCNC: 80 U/L (ref 45–120)
ALT SERPL W P-5'-P-CCNC: 71 U/L (ref 0–45)
ANION GAP SERPL CALCULATED.3IONS-SCNC: 6 MMOL/L (ref 5–18)
AST SERPL W P-5'-P-CCNC: 72 U/L (ref 0–40)
B MICROTI DNA BLD QL NAA+PROBE: NOT DETECTED
BABESIA DNA BLD QL NAA+PROBE: NOT DETECTED
BILIRUB SERPL-MCNC: 0.5 MG/DL (ref 0–1)
BUN SERPL-MCNC: 8 MG/DL (ref 8–22)
CALCIUM SERPL-MCNC: 7.9 MG/DL (ref 8.5–10.5)
CHLORIDE BLD-SCNC: 108 MMOL/L (ref 98–107)
CO2 SERPL-SCNC: 26 MMOL/L (ref 22–31)
CREAT SERPL-MCNC: 0.61 MG/DL (ref 0.7–1.3)
E CHAFFEENSIS DNA BLD QL NAA+PROBE: NOT DETECTED
E EWINGII DNA SPEC QL NAA+PROBE: NOT DETECTED
EHRLICHIA DNA SPEC QL NAA+PROBE: NOT DETECTED
ERYTHROCYTE [DISTWIDTH] IN BLOOD BY AUTOMATED COUNT: 13.7 % (ref 10–15)
GFR SERPL CREATININE-BSD FRML MDRD: >90 ML/MIN/1.73M2
GLUCOSE BLD-MCNC: 88 MG/DL (ref 70–125)
HCT VFR BLD AUTO: 34.8 % (ref 40–53)
HGB BLD-MCNC: 11.4 G/DL (ref 13.3–17.7)
MAGNESIUM SERPL-MCNC: 1.9 MG/DL (ref 1.8–2.6)
MCH RBC QN AUTO: 29.8 PG (ref 26.5–33)
MCHC RBC AUTO-ENTMCNC: 32.8 G/DL (ref 31.5–36.5)
MCV RBC AUTO: 91 FL (ref 78–100)
PLATELET # BLD AUTO: 176 10E3/UL (ref 150–450)
POTASSIUM BLD-SCNC: 3.7 MMOL/L (ref 3.5–5)
PROT SERPL-MCNC: 5.9 G/DL (ref 6–8)
RBC # BLD AUTO: 3.82 10E6/UL (ref 4.4–5.9)
SODIUM SERPL-SCNC: 140 MMOL/L (ref 136–145)
WBC # BLD AUTO: 4.2 10E3/UL (ref 4–11)

## 2022-07-13 PROCEDURE — 80053 COMPREHEN METABOLIC PANEL: CPT | Performed by: FAMILY MEDICINE

## 2022-07-13 PROCEDURE — 99239 HOSP IP/OBS DSCHRG MGMT >30: CPT | Performed by: INTERNAL MEDICINE

## 2022-07-13 PROCEDURE — 85027 COMPLETE CBC AUTOMATED: CPT | Performed by: FAMILY MEDICINE

## 2022-07-13 PROCEDURE — 258N000003 HC RX IP 258 OP 636: Performed by: INTERNAL MEDICINE

## 2022-07-13 PROCEDURE — 250N000013 HC RX MED GY IP 250 OP 250 PS 637: Performed by: FAMILY MEDICINE

## 2022-07-13 PROCEDURE — 250N000011 HC RX IP 250 OP 636: Performed by: INTERNAL MEDICINE

## 2022-07-13 PROCEDURE — 83735 ASSAY OF MAGNESIUM: CPT | Performed by: INTERNAL MEDICINE

## 2022-07-13 RX ORDER — DOXYCYCLINE 100 MG/1
100 CAPSULE ORAL EVERY 12 HOURS
Qty: 20 CAPSULE | Refills: 0 | Status: SHIPPED | OUTPATIENT
Start: 2022-07-13 | End: 2022-07-23

## 2022-07-13 RX ORDER — DOXYCYCLINE 100 MG/1
100 CAPSULE ORAL EVERY 12 HOURS SCHEDULED
Status: DISCONTINUED | OUTPATIENT
Start: 2022-07-13 | End: 2022-07-13 | Stop reason: HOSPADM

## 2022-07-13 RX ADMIN — PANTOPRAZOLE SODIUM 40 MG: 40 TABLET, DELAYED RELEASE ORAL at 07:32

## 2022-07-13 RX ADMIN — DOXYCYCLINE 100 MG: 100 INJECTION, POWDER, LYOPHILIZED, FOR SOLUTION INTRAVENOUS at 05:49

## 2022-07-13 ASSESSMENT — ACTIVITIES OF DAILY LIVING (ADL)
ADLS_ACUITY_SCORE: 20

## 2022-07-13 NOTE — PLAN OF CARE
Goal Outcome Evaluation:    [facility] - ICU    RN Progress Note:            Pertinent Assessments:      Please refer to flowsheet rows for full assessment     VSS, voiding well, sinus rhythm         Mobility Level:     independent         Key Events - This Shift:     Denies pain, nausea, shortness of breath. Ate well, food from home.              Plan:     Discharging to home at 15:00         Point of Contact Update YES-OR-NO: Yes and N/A  If No, reason:   Name:  Phone Number:  Summary of Conversation: pt discharging home, able to communicate with family.

## 2022-07-13 NOTE — DISCHARGE SUMMARY
[facility]  Hospitalist Discharge Summary      Date of Admission:  7/10/2022  Date of Discharge:  7/13/2022  Discharging Provider: Huan Dyer MD  Discharge Service: Hospitalist Service    Discharge Diagnoses     Principal Problem:    Anaplasmosis (a. phagocytophilum)  Active Problems:    Thrombocytopenia (H)    Elevated liver function tests    Acute febrile illness    Abnormal electrocardiogram    Infective myocarditis      Follow-ups Needed After Discharge   Follow-up Appointments     Follow-up and recommended labs and tests       * Follow up with primary care provider, Physician No Ref-Primary, within   1-2 weeks for hospital follow- up.  The following labs/tests are   recommended: Recheck liver function and CBC for hemoglobin, platelet   count.    * If you have recurrent chest pain and SOB, talk to your doctor. You may   need to see a cardiologist to have a MRI scan of your heart.  [facility]  Tel: [phone]           Discharge Disposition   Discharged to home  Condition at discharge: Stable      Hospital Course     Patient is a 47-year-old male without significant medical history presented to the emergency department on 7/10/22 with 9-day history of fever, headache, malaise and chest pain. He reported to have recent tick bite. In ER, patient had fever, tachycardia, tachypnea and elevated procalcitonin. He also had elevated transaminases and thrombocytopenia. US of the abdomen was unremarkable. He was admitted for sepsis due to tick born illness. Patient was started doxycycline. EKG revealed significant ST changes. Troponin was normal. Echocardiogram showed LVEF 55% with inferior and posterior wall hypokinesis. Patient likely has myocarditis secondary to tickborne illness. During his admission, his symptoms gradually resolved. Transaminases improved and thrombocytopenia resolved. Patient was discharged home in a stable condition. After he was discharged, lab test  returned to be Anaplasma phgocytophilum positive.        Consultations This Hospital Stay   CARE MANAGEMENT / SOCIAL WORK IP CONSULT  HOSPITALIST IP CONSULT  PHYSICAL THERAPY ADULT IP CONSULT  OCCUPATIONAL THERAPY ADULT IP CONSULT  VASCULAR ACCESS ADULT IP CONSULT  HOSPITALIST IP CONSULT  CARDIOLOGY IP CONSULT    Code Status   Prior    Time Spent on this Encounter   I, Huan Dyer MD, personally saw the patient today and spent greater than 30 minutes discharging this patient.       Huan Dyer MD  [address] [address]  Phone: [phone]  Fax: [phone]  ______________________________________________________________________    Physical Exam   Vital Signs: Temp: 97.8  F (36.6  C) Temp src: Oral BP: 111/77 Pulse: 80   Resp: 23 SpO2: 99 % O2 Device: None (Room air)    Weight: 139 lbs 12.35 oz    General appearance: not in acute distress  HEENT: PERRL, EOMI  Lungs: Clear breath sounds in bilateral lung fields  Cardiovascular: Regular rate and rhythm, normal S1-S2  Abdomen: Soft, non tender, no distension  Musculoskeletal: No joint swelling  Skin: No rash and no edema  Neurology: AAO ×3.  Cranial nerves II - XII normal.  Normal muscle strength in all four extremities.       Primary Care Physician   Physician No Ref-Primary    Discharge Orders      Reason for your hospital stay    Admitted for fever, headache, malaise and chest pain.     Follow-up and recommended labs and tests     * Follow up with primary care provider, Physician No Ref-Primary, within 1-2 weeks for hospital follow- up.  The following labs/tests are recommended: Recheck liver function and CBC for hemoglobin, platelet count.    * If you have recurrent chest pain and SOB, talk to your doctor. You may need to see a cardiologist to have a MRI scan of your heart.  [facility]  Tel: [phone]     Activity    Your activity upon discharge: activity as tolerated      Diet    Follow this diet upon discharge: Regular Diet       Significant Results and Procedures   Most Recent 3 CBC's:Recent Labs   Lab Test 07/13/22  0549 07/12/22  0529 07/11/22  0514   WBC 4.2 4.2 4.6   HGB 11.4* 11.2* 11.0*   MCV 91 91 90    128* 80*     Most Recent 3 BMP's:Recent Labs   Lab Test 07/13/22  0549 07/12/22  0529 07/11/22  0514    138 134*   POTASSIUM 3.7 3.5 3.9  3.9   CHLORIDE 108* 106 104   CO2 26 25 23   BUN 8 7* 11   CR 0.61* 0.64* 0.65*   ANIONGAP 6 7 7   LISA 7.9* 7.9* 7.8*   GLC 88 90 86     Most Recent 2 LFT's:Recent Labs   Lab Test 07/13/22  0549 07/12/22  0529   AST 72* 58*   ALT 71* 72*   ALKPHOS 80 93   BILITOTAL 0.5 0.5       EXAM: XR CHEST PORT 1 VIEW  LOCATION: [facility]  DATE/TIME: 7/10/2022 11:51 AM     INDICATION: fever chest pain  COMPARISON: None.                                                                    IMPRESSION: Minimal left basilar atelectasis. No pleural effusion. The cardiac silhouette and pulmonary vasculature are normal.      EXAM: CT HEAD W/O CONTRAST  LOCATION: [facility]  DATE/TIME: 7/10/2022 12:32 PM     INDICATION: Pain evaluate for any acute bleed prior to heparinization.  COMPARISON: None.  TECHNIQUE: Routine CT Head without IV contrast. Multiplanar reformats. Dose reduction techniques were used.     FINDINGS:  INTRACRANIAL CONTENTS: No evidence of acute intracranial hemorrhage or mass effect. Brain attenuation and morphology are normal. The ventricles and sulci are normal for age. Normal gray-white matter differentiation. The basilar cisterns are patent.     VISUALIZED ORBITS/SINUSES/MASTOIDS: The globes are unremarkable. The partially imaged paranasal sinuses, mastoid air cells and middle ear cavities are unremarkable.      BONES/SOFT TISSUES: The visualized skull base and calvarium are unremarkable.                                                                    IMPRESSION:    1.   No evidence of acute intracranial hemorrhage or mass effect.      EXAM: US ABDOMEN LIMITED  LOCATION: [facility]  DATE/TIME: 7/10/2022 3:19 PM     INDICATION: hyperbilirubinemia, evaluate for cholecystitis  COMPARISON: None.  TECHNIQUE: Limited abdominal ultrasound.     FINDINGS:     GALLBLADDER: Normal. No gallstones, wall thickening, or pericholecystic fluid. Negative sonographic Gordon's sign.     BILE DUCTS: No biliary dilatation. The common duct measures 5 mm.     LIVER: Normal parenchyma with smooth contour. No focal mass.     RIGHT KIDNEY: No hydronephrosis.     PANCREAS: The visualized portions are normal.     No ascites.                                                                      IMPRESSION:  1.  Normal limited abdominal ultrasound.       ECHOCARDIOGRAM:    Interpretation Summary     The left ventricle is normal in size.  The visual ejection fraction is 50-55%.  There is mild to moderate inferior wall hypokinesis.  Posterior wall appears hypokinetic.  Suspect normal RVEF  The right ventricular systolic pressure is approximated at 19mmHg plus the  right atrial pressure.  IVC diameter <2.1 cm collapsing >50% with sniff suggests a normal RA pressure  of 3 mmHg.  Normal size ascending aorta.  Trivial pericardial effusion  The rhythm was sinus tachycardia.      Discharge Medications   Discharge Medication List as of 7/13/2022  2:08 PM      START taking these medications    Details   doxycycline monohydrate (MONODOX) 100 MG capsule Take 1 capsule (100 mg) by mouth every 12 hours for 10 days, Disp-20 capsule, R-0, E-Prescribe           Allergies   Allergies   Allergen Reactions     Colton Flavor

## 2022-07-13 NOTE — PROGRESS NOTES
Pt discharged to home via family vehicle at 14:20. All education and goals complete and adequate for discharge. All belongings sent with patient.

## 2022-07-13 NOTE — PLAN OF CARE
[facility] - ICU    RN Progress Note:            Pertinent Assessments:      Please refer to flowsheet rows for full assessment     WNL at this time.  Patient denies any concerns.         Mobility Level:     Independent in room with activity per report.  Not out of bed this shift.         Key Events - This Shift:     Patient hopes to get transferred out of the ICU today.  Otherwise no concerns.              Plan:     Hopefully transfer out of the ICU today.     Jamie Riojas RN

## 2022-07-13 NOTE — PLAN OF CARE
[facility] - ICU    RN Progress Note:            Pertinent Assessments:      Please refer to flowsheet rows for full assessment     Patient denies any concerns at this time.  Infrequent cough present.         Mobility Level:     Patient is able to reposition self in bed         Key Events - This Shift:     No significant events this shift.                Plan:     Encourage activity.       Jamie Riojas RN

## 2022-07-15 PROBLEM — A79.82: Status: ACTIVE | Noted: 2022-07-15

## 2022-07-15 PROBLEM — I40.0: Status: ACTIVE | Noted: 2022-07-15

## 2022-07-15 LAB
A PHAGOCYTOPH IGG TITR SER IF: ABNORMAL {TITER}
A PHAGOCYTOPH IGM TITR SER IF: ABNORMAL {TITER}
BACTERIA BLD CULT: NO GROWTH
BACTERIA BLD CULT: NO GROWTH

## 2022-07-24 ENCOUNTER — HEALTH MAINTENANCE LETTER (OUTPATIENT)
Age: 47
End: 2022-07-24

## 2022-10-02 ENCOUNTER — HEALTH MAINTENANCE LETTER (OUTPATIENT)
Age: 47
End: 2022-10-02

## 2023-08-12 ENCOUNTER — HEALTH MAINTENANCE LETTER (OUTPATIENT)
Age: 48
End: 2023-08-12

## 2024-10-05 ENCOUNTER — HEALTH MAINTENANCE LETTER (OUTPATIENT)
Age: 49
End: 2024-10-05

## (undated) DEVICE — ELECTRODE ADULT PACING MULTI P-211-M1

## (undated) DEVICE — RAD INFLATOR BASIC COMPAK  IN4130

## (undated) DEVICE — CATH DIAG 4FR JR 5.0 538423

## (undated) DEVICE — INTRO MICRO MINI STICK 4FR STD NITINOL

## (undated) DEVICE — KIT HAND CONTROL ACIST 014644 AR-P54

## (undated) DEVICE — SYR ANGIOGRAPHY MULTIUSE KIT ACIST 014612

## (undated) DEVICE — GUIDEWIRE FORTE FLOPPY J TOP 34949-05J

## (undated) DEVICE — MANIFOLD KIT ANGIO AUTOMATED 014613

## (undated) DEVICE — SHEATH GLIDE RADIAL 6FR 25CM 0.021

## (undated) DEVICE — CATH ANGIO INFINITI JL3.5 4FRX100CM 538418

## (undated) DEVICE — GUIDEWIRE VASCULAR 0.035IN DIA 145CML 15CML/6CML STAINLESS

## (undated) DEVICE — 0.035IN X 260CM INQWIRE DIAGNOSTIC GUIDEWIRE, FIXED-CORE PTFE COATED, 3MM J-TIP

## (undated) DEVICE — CUSTOM PACK CORONARY SAN5BCRHEA